# Patient Record
Sex: FEMALE | Race: OTHER | HISPANIC OR LATINO | URBAN - METROPOLITAN AREA
[De-identification: names, ages, dates, MRNs, and addresses within clinical notes are randomized per-mention and may not be internally consistent; named-entity substitution may affect disease eponyms.]

---
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
## 2018-01-24 ENCOUNTER — APPOINTMENT (OUTPATIENT)
Dept: NEUROLOGY | Facility: CLINIC | Age: 18
End: 2018-01-24
Payer: MEDICAID

## 2018-01-24 VITALS
SYSTOLIC BLOOD PRESSURE: 100 MMHG | HEIGHT: 65 IN | OXYGEN SATURATION: 99 % | BODY MASS INDEX: 31.65 KG/M2 | HEART RATE: 66 BPM | WEIGHT: 190 LBS | DIASTOLIC BLOOD PRESSURE: 60 MMHG

## 2018-01-24 DIAGNOSIS — F50.2 BULIMIA NERVOSA: ICD-10-CM

## 2018-01-24 DIAGNOSIS — G43.909 MIGRAINE, UNSPECIFIED, NOT INTRACTABLE, W/OUT STATUS MIGRAINOSUS: ICD-10-CM

## 2018-01-24 PROCEDURE — 99204 OFFICE O/P NEW MOD 45 MIN: CPT

## 2018-01-24 RX ORDER — SUMATRIPTAN 100 MG/1
100 TABLET, FILM COATED ORAL
Qty: 9 | Refills: 5 | Status: ACTIVE | COMMUNITY
Start: 2018-01-24 | End: 1900-01-01

## 2018-03-02 ENCOUNTER — APPOINTMENT (OUTPATIENT)
Dept: NEUROLOGY | Facility: CLINIC | Age: 18
End: 2018-03-02

## 2018-09-09 ENCOUNTER — EMERGENCY (EMERGENCY)
Facility: HOSPITAL | Age: 18
LOS: 1 days | Discharge: HOME | End: 2018-09-09
Admitting: EMERGENCY MEDICINE
Payer: COMMERCIAL

## 2018-09-09 VITALS
HEART RATE: 82 BPM | RESPIRATION RATE: 16 BRPM | TEMPERATURE: 98 F | DIASTOLIC BLOOD PRESSURE: 64 MMHG | OXYGEN SATURATION: 100 % | SYSTOLIC BLOOD PRESSURE: 107 MMHG

## 2018-09-09 PROCEDURE — 99283 EMERGENCY DEPT VISIT LOW MDM: CPT

## 2018-09-09 RX ORDER — ACETAMINOPHEN 500 MG
650 TABLET ORAL ONCE
Qty: 0 | Refills: 0 | Status: COMPLETED | OUTPATIENT
Start: 2018-09-09 | End: 2018-09-09

## 2018-09-09 RX ADMIN — Medication 650 MILLIGRAM(S): at 23:30

## 2018-09-09 NOTE — ED PROVIDER NOTE - PLAN OF CARE
Advance activity as tolerated.  Continue all previously prescribed medications as directed unless otherwise instructed.  Take Motrin (also sold as Advil or Ibuprofen) 400-600 mg (two or three 200 mg over the counter pills) every 8 hours as needed for moderate pain or fevers-- take with food. Take Tylenol 650mg (Two 325 mg pills) every 4-6 hours as needed for pain or fevers. Apply cool compresses for 15 minutes to affected area, 3-4 times per day. Follow up with your primary care physician and hand specialist (referral list provided) in 48-72 hours- bring copies of your results.  Return to the ER for worsening or persistent symptoms, including but not limited to worsening/persistent pain, fevers, redness, swelling, numbness, weakness, and/or ANY NEW OR CONCERNING SYMPTOMS. If you have issues obtaining follow up, please call: [phone] to obtain a doctor or specialist who takes your insurance in your area.  You may call [phone] to make an appointment with the internal medicine clinic.

## 2018-09-09 NOTE — ED PROVIDER NOTE - ABDOMINAL EXAM
Abdomen soft, non-tender, non-distended. No bruising, no abrasion, no laceration./nontender.../soft/nondistended

## 2018-09-09 NOTE — ED PROVIDER NOTE - MEDICAL DECISION MAKING DETAILS
18y/o F, non-smoker, with PMHx of appendectomy, presents with R hand pain x5h -- r/o fx, likely contusion -- will XR, pain control
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
## 2018-09-09 NOTE — ED PROVIDER NOTE - UPPER EXTREMITY EXAM, RIGHT
TENDERNESS/R hand has TTP over R thenar eminence. No palpable deformities. ROM to 1st digit limited secondary to pain. Sensation intact to light touch distal to injury. <2s capillary refill. TENDERNESS/R hand has TTP over R thenar eminence. No palpable deformities. ROM to 1st digit limited secondary to pain. Sensation intact to light touch distal to injury. <2s capillary refill.  No redness, no warmth, no crepitus, no fluctuance., no induration. TENDERNESS/No snuffbox tenderness; no pain with axial loading. R hand has TTP over R thenar eminence. No palpable deformities. ROM to 1st digit limited secondary to pain. Sensation intact to light touch distal to injury. <2s capillary refill.  No redness, no warmth, no crepitus, no fluctuance., no induration.

## 2018-09-09 NOTE — ED PROVIDER NOTE - PROGRESS NOTE DETAILS
BISHNU ROCHA:  Xray negative for acute pathology.  Pt medically stable for discharge. Pt to follow up with PMD and hand specialist (referral list provided).

## 2018-09-09 NOTE — ED PROVIDER NOTE - CARE PLAN
Principal Discharge DX:	Hand pain  Assessment and plan of treatment:	Advance activity as tolerated.  Continue all previously prescribed medications as directed unless otherwise instructed.  Take Motrin (also sold as Advil or Ibuprofen) 400-600 mg (two or three 200 mg over the counter pills) every 8 hours as needed for moderate pain or fevers-- take with food. Take Tylenol 650mg (Two 325 mg pills) every 4-6 hours as needed for pain or fevers. Apply cool compresses for 15 minutes to affected area, 3-4 times per day. Follow up with your primary care physician and hand specialist (referral list provided) in 48-72 hours- bring copies of your results.  Return to the ER for worsening or persistent symptoms, including but not limited to worsening/persistent pain, fevers, redness, swelling, numbness, weakness, and/or ANY NEW OR CONCERNING SYMPTOMS. If you have issues obtaining follow up, please call: [phone] to obtain a doctor or specialist who takes your insurance in your area.  You may call [phone] to make an appointment with the internal medicine clinic.

## 2018-09-10 PROCEDURE — 73130 X-RAY EXAM OF HAND: CPT | Mod: 26,RT

## 2022-02-14 NOTE — ED PROVIDER NOTE - NS_ACPWITHSCRIBE_ED_ALL_ED
97.8 "I personally performed the services described in the documentation  recorded by the scribe in my presence, and it accurately and completely records my words and action.”

## 2022-09-09 NOTE — ED PROVIDER NOTE - CONDITION AT DISCHARGE:
HST POST-STUDY QUESTIONNAIRE    1. What time did you go to bed? 12 AM   2. How long do you think it took to fall asleep?  30 minutes   3. What time did you wake up to start the day?  7:00 AM   4. Did you get up during the night at all?  yes  5. If you woke up, do you remember approximately what time(s)? No   6. Did you have any difficulty with the equipment?  No  7. Did you us any type of treatment with this study?  None  8. Was the head of the bed elevated? No  9. Did you sleep in a recliner?  No  10. Did you stop using CPAP at least 3 days before this test?  NA  11. Any other information you'd like us to know?       Improved

## 2024-04-04 ENCOUNTER — EMERGENCY (EMERGENCY)
Facility: HOSPITAL | Age: 24
LOS: 1 days | Discharge: HOME | End: 2024-04-04
Attending: STUDENT IN AN ORGANIZED HEALTH CARE EDUCATION/TRAINING PROGRAM | Admitting: STUDENT IN AN ORGANIZED HEALTH CARE EDUCATION/TRAINING PROGRAM
Payer: MEDICAID

## 2024-04-04 VITALS
SYSTOLIC BLOOD PRESSURE: 109 MMHG | TEMPERATURE: 97 F | OXYGEN SATURATION: 100 % | DIASTOLIC BLOOD PRESSURE: 70 MMHG | RESPIRATION RATE: 16 BRPM | HEART RATE: 85 BPM | WEIGHT: 166.89 LBS

## 2024-04-04 VITALS
HEART RATE: 83 BPM | TEMPERATURE: 97 F | SYSTOLIC BLOOD PRESSURE: 108 MMHG | OXYGEN SATURATION: 100 % | RESPIRATION RATE: 18 BRPM | DIASTOLIC BLOOD PRESSURE: 72 MMHG

## 2024-04-04 LAB
ALBUMIN SERPL ELPH-MCNC: 4.3 G/DL — SIGNIFICANT CHANGE UP (ref 3.3–5)
ALP SERPL-CCNC: 71 U/L — SIGNIFICANT CHANGE UP (ref 40–120)
ALT FLD-CCNC: 5 U/L — SIGNIFICANT CHANGE UP (ref 4–33)
ANION GAP SERPL CALC-SCNC: 13 MMOL/L — SIGNIFICANT CHANGE UP (ref 7–14)
APPEARANCE UR: CLEAR — SIGNIFICANT CHANGE UP
AST SERPL-CCNC: 13 U/L — SIGNIFICANT CHANGE UP (ref 4–32)
BACTERIA # UR AUTO: NEGATIVE /HPF — SIGNIFICANT CHANGE UP
BASOPHILS # BLD AUTO: 0.03 K/UL — SIGNIFICANT CHANGE UP (ref 0–0.2)
BASOPHILS NFR BLD AUTO: 0.3 % — SIGNIFICANT CHANGE UP (ref 0–2)
BILIRUB SERPL-MCNC: 0.8 MG/DL — SIGNIFICANT CHANGE UP (ref 0.2–1.2)
BILIRUB UR-MCNC: NEGATIVE — SIGNIFICANT CHANGE UP
BUN SERPL-MCNC: 15 MG/DL — SIGNIFICANT CHANGE UP (ref 7–23)
CALCIUM SERPL-MCNC: 9.3 MG/DL — SIGNIFICANT CHANGE UP (ref 8.4–10.5)
CAST: 0 /LPF — SIGNIFICANT CHANGE UP (ref 0–4)
CHLORIDE SERPL-SCNC: 102 MMOL/L — SIGNIFICANT CHANGE UP (ref 98–107)
CO2 SERPL-SCNC: 24 MMOL/L — SIGNIFICANT CHANGE UP (ref 22–31)
COLOR SPEC: YELLOW — SIGNIFICANT CHANGE UP
CREAT SERPL-MCNC: 0.75 MG/DL — SIGNIFICANT CHANGE UP (ref 0.5–1.3)
DIFF PNL FLD: NEGATIVE — SIGNIFICANT CHANGE UP
EGFR: 113 ML/MIN/1.73M2 — SIGNIFICANT CHANGE UP
EOSINOPHIL # BLD AUTO: 0.32 K/UL — SIGNIFICANT CHANGE UP (ref 0–0.5)
EOSINOPHIL NFR BLD AUTO: 3.1 % — SIGNIFICANT CHANGE UP (ref 0–6)
GLUCOSE SERPL-MCNC: 88 MG/DL — SIGNIFICANT CHANGE UP (ref 70–99)
GLUCOSE UR QL: NEGATIVE MG/DL — SIGNIFICANT CHANGE UP
HCG SERPL-ACNC: <1 MIU/ML — SIGNIFICANT CHANGE UP
HCT VFR BLD CALC: 36 % — SIGNIFICANT CHANGE UP (ref 34.5–45)
HGB BLD-MCNC: 12 G/DL — SIGNIFICANT CHANGE UP (ref 11.5–15.5)
IANC: 6.19 K/UL — SIGNIFICANT CHANGE UP (ref 1.8–7.4)
IMM GRANULOCYTES NFR BLD AUTO: 0.3 % — SIGNIFICANT CHANGE UP (ref 0–0.9)
KETONES UR-MCNC: ABNORMAL MG/DL
LEUKOCYTE ESTERASE UR-ACNC: NEGATIVE — SIGNIFICANT CHANGE UP
LIDOCAIN IGE QN: 42 U/L — SIGNIFICANT CHANGE UP (ref 7–60)
LYMPHOCYTES # BLD AUTO: 3.21 K/UL — SIGNIFICANT CHANGE UP (ref 1–3.3)
LYMPHOCYTES # BLD AUTO: 31.1 % — SIGNIFICANT CHANGE UP (ref 13–44)
MCHC RBC-ENTMCNC: 30.4 PG — SIGNIFICANT CHANGE UP (ref 27–34)
MCHC RBC-ENTMCNC: 33.3 GM/DL — SIGNIFICANT CHANGE UP (ref 32–36)
MCV RBC AUTO: 91.1 FL — SIGNIFICANT CHANGE UP (ref 80–100)
MONOCYTES # BLD AUTO: 0.55 K/UL — SIGNIFICANT CHANGE UP (ref 0–0.9)
MONOCYTES NFR BLD AUTO: 5.3 % — SIGNIFICANT CHANGE UP (ref 2–14)
NEUTROPHILS # BLD AUTO: 6.19 K/UL — SIGNIFICANT CHANGE UP (ref 1.8–7.4)
NEUTROPHILS NFR BLD AUTO: 59.9 % — SIGNIFICANT CHANGE UP (ref 43–77)
NITRITE UR-MCNC: NEGATIVE — SIGNIFICANT CHANGE UP
NRBC # BLD: 0 /100 WBCS — SIGNIFICANT CHANGE UP (ref 0–0)
NRBC # FLD: 0 K/UL — SIGNIFICANT CHANGE UP (ref 0–0)
PH UR: 6.5 — SIGNIFICANT CHANGE UP (ref 5–8)
PLATELET # BLD AUTO: 301 K/UL — SIGNIFICANT CHANGE UP (ref 150–400)
POTASSIUM SERPL-MCNC: 4.4 MMOL/L — SIGNIFICANT CHANGE UP (ref 3.5–5.3)
POTASSIUM SERPL-SCNC: 4.4 MMOL/L — SIGNIFICANT CHANGE UP (ref 3.5–5.3)
PROT SERPL-MCNC: 7.5 G/DL — SIGNIFICANT CHANGE UP (ref 6–8.3)
PROT UR-MCNC: NEGATIVE MG/DL — SIGNIFICANT CHANGE UP
RBC # BLD: 3.95 M/UL — SIGNIFICANT CHANGE UP (ref 3.8–5.2)
RBC # FLD: 12.5 % — SIGNIFICANT CHANGE UP (ref 10.3–14.5)
RBC CASTS # UR COMP ASSIST: 2 /HPF — SIGNIFICANT CHANGE UP (ref 0–4)
SODIUM SERPL-SCNC: 139 MMOL/L — SIGNIFICANT CHANGE UP (ref 135–145)
SP GR SPEC: 1.03 — SIGNIFICANT CHANGE UP (ref 1–1.03)
SQUAMOUS # UR AUTO: 5 /HPF — SIGNIFICANT CHANGE UP (ref 0–5)
UROBILINOGEN FLD QL: 1 MG/DL — SIGNIFICANT CHANGE UP (ref 0.2–1)
WBC # BLD: 10.33 K/UL — SIGNIFICANT CHANGE UP (ref 3.8–10.5)
WBC # FLD AUTO: 10.33 K/UL — SIGNIFICANT CHANGE UP (ref 3.8–10.5)
WBC UR QL: 1 /HPF — SIGNIFICANT CHANGE UP (ref 0–5)

## 2024-04-04 PROCEDURE — 99285 EMERGENCY DEPT VISIT HI MDM: CPT

## 2024-04-04 PROCEDURE — 74177 CT ABD & PELVIS W/CONTRAST: CPT | Mod: 26,MC

## 2024-04-04 RX ORDER — DIATRIZOATE MEGLUMINE 180 MG/ML
30 INJECTION, SOLUTION INTRAVESICAL ONCE
Refills: 0 | Status: DISCONTINUED | OUTPATIENT
Start: 2024-04-04 | End: 2024-04-04

## 2024-04-04 RX ORDER — ONDANSETRON 8 MG/1
4 TABLET, FILM COATED ORAL ONCE
Refills: 0 | Status: COMPLETED | OUTPATIENT
Start: 2024-04-04 | End: 2024-04-04

## 2024-04-04 RX ORDER — SODIUM CHLORIDE 9 MG/ML
1000 INJECTION INTRAMUSCULAR; INTRAVENOUS; SUBCUTANEOUS ONCE
Refills: 0 | Status: COMPLETED | OUTPATIENT
Start: 2024-04-04 | End: 2024-04-04

## 2024-04-04 RX ORDER — IOHEXOL 300 MG/ML
30 INJECTION, SOLUTION INTRAVENOUS ONCE
Refills: 0 | Status: COMPLETED | OUTPATIENT
Start: 2024-04-04 | End: 2024-04-04

## 2024-04-04 RX ORDER — KETOROLAC TROMETHAMINE 30 MG/ML
15 SYRINGE (ML) INJECTION ONCE
Refills: 0 | Status: DISCONTINUED | OUTPATIENT
Start: 2024-04-04 | End: 2024-04-04

## 2024-04-04 RX ADMIN — IOHEXOL 30 MILLILITER(S): 300 INJECTION, SOLUTION INTRAVENOUS at 21:17

## 2024-04-04 RX ADMIN — ONDANSETRON 4 MILLIGRAM(S): 8 TABLET, FILM COATED ORAL at 20:58

## 2024-04-04 RX ADMIN — Medication 15 MILLIGRAM(S): at 21:20

## 2024-04-04 RX ADMIN — SODIUM CHLORIDE 1000 MILLILITER(S): 9 INJECTION INTRAMUSCULAR; INTRAVENOUS; SUBCUTANEOUS at 20:59

## 2024-04-04 RX ADMIN — Medication 15 MILLIGRAM(S): at 20:58

## 2024-04-04 NOTE — ED PROVIDER NOTE - CLINICAL SUMMARY MEDICAL DECISION MAKING FREE TEXT BOX
23-year-old female with history of gastric bypass coming in with 1 week of nausea, vomiting, diffuse abdominal pain, flank pain. LMP few days ago. No f/c. Seen at [facility] - blood work and UA was unremarkable and she was discharged home. Last bm was this morning, no diarrhea, blood in stools. Well appearing. no distress. CTAB. RRR. Mild diffuse ttp more in suprapubic region. +cva ttp b/l. Ddx include but not limited to UTI/ pyelonephritis, anemia, electrolyte abnormalities. Low concern for sbo.

## 2024-04-04 NOTE — ED ADULT NURSE NOTE - NSFALLUNIVINTERV_ED_ALL_ED
Bed/Stretcher in lowest position, wheels locked, appropriate side rails in place/Call bell, personal items and telephone in reach/Instruct patient to call for assistance before getting out of bed/chair/stretcher/Non-slip footwear applied when patient is off stretcher/[place] to call system/Physically safe environment - no spills, clutter or unnecessary equipment/Purposeful proactive rounding/Room/bathroom lighting operational, light cord in reach

## 2024-04-04 NOTE — ED PROVIDER NOTE - PATIENT PORTAL LINK FT
You can access the FollowMyHealth Patient Portal offered by [facility] by registering at the following website: [email or web address]. By joining [facility]’s FollowMyHealth portal, you will also be able to view your health information using other applications (apps) compatible with our system.

## 2024-04-04 NOTE — ED ADULT TRIAGE NOTE - CHIEF COMPLAINT QUOTE
c/o N/V, pelvic pain, back pain, chills dizzy x 1 week. Denies urinary s/s. Reports menstrual cycle ended yesterday denies hx

## 2024-04-04 NOTE — ED PROVIDER NOTE - ATTENDING CONTRIBUTION TO CARE
23-year-old female with history of gastric bypass coming in with 1 week of nausea, vomiting, diffuse abdominal pain, flank pain. LMP few days ago. No f/c. Seen at [facility] - blood work and UA was unremarkable and she was discharged home. Last bm was this morning, no diarrhea, blood in stools. Well appearing. no distress. CTAB. RRR. Mild diffuse ttp more in suprapubic region. +cva ttp b/l. Ddx include but not limited to UTI/pyelonpehritis, anemia, electrolyte abnormalities. Low conern for sbo.

## 2024-04-04 NOTE — ED ADULT NURSE NOTE - OBJECTIVE STATEMENT
23 y female with past medical history appendicitis, gastric sleeve surgery in 2021 c/o N/V, pelvic pain, back pain,  1 week. Denies urinary s/s, diarrhea , fever. 23 y female with past medical history appendicitis, gastric sleeve surgery in 2021 c/o N/V, pelvic pain, back pain,  1 week. Denies urinary s/s, diarrhea , fever. josias BM today. 20g IV placed  L AC. meds administered as ordered.

## 2024-04-06 LAB
CULTURE RESULTS: SIGNIFICANT CHANGE UP
SPECIMEN SOURCE: SIGNIFICANT CHANGE UP

## 2024-09-21 ENCOUNTER — EMERGENCY (EMERGENCY)
Facility: HOSPITAL | Age: 24
LOS: 1 days | Discharge: HOME | End: 2024-09-21
Admitting: EMERGENCY MEDICINE
Payer: MEDICAID

## 2024-09-21 VITALS
WEIGHT: 166.89 LBS | TEMPERATURE: 98 F | DIASTOLIC BLOOD PRESSURE: 71 MMHG | HEART RATE: 92 BPM | HEIGHT: 65 IN | OXYGEN SATURATION: 100 % | RESPIRATION RATE: 16 BRPM | SYSTOLIC BLOOD PRESSURE: 118 MMHG

## 2024-09-21 LAB
ALBUMIN SERPL ELPH-MCNC: 4.3 G/DL — SIGNIFICANT CHANGE UP (ref 3.3–5)
ALP SERPL-CCNC: 72 U/L — SIGNIFICANT CHANGE UP (ref 40–120)
ALT FLD-CCNC: 8 U/L — SIGNIFICANT CHANGE UP (ref 4–33)
ANION GAP SERPL CALC-SCNC: 12 MMOL/L — SIGNIFICANT CHANGE UP (ref 7–14)
APPEARANCE UR: CLEAR — SIGNIFICANT CHANGE UP
AST SERPL-CCNC: 15 U/L — SIGNIFICANT CHANGE UP (ref 4–32)
BACTERIA # UR AUTO: ABNORMAL /HPF
BASOPHILS # BLD AUTO: 0.03 K/UL — SIGNIFICANT CHANGE UP (ref 0–0.2)
BASOPHILS NFR BLD AUTO: 0.3 % — SIGNIFICANT CHANGE UP (ref 0–2)
BILIRUB SERPL-MCNC: 0.8 MG/DL — SIGNIFICANT CHANGE UP (ref 0.2–1.2)
BILIRUB UR-MCNC: NEGATIVE — SIGNIFICANT CHANGE UP
BLD GP AB SCN SERPL QL: NEGATIVE — SIGNIFICANT CHANGE UP
BUN SERPL-MCNC: 13 MG/DL — SIGNIFICANT CHANGE UP (ref 7–23)
CALCIUM SERPL-MCNC: 9.2 MG/DL — SIGNIFICANT CHANGE UP (ref 8.4–10.5)
CAST: 0 /LPF — SIGNIFICANT CHANGE UP (ref 0–4)
CHLORIDE SERPL-SCNC: 101 MMOL/L — SIGNIFICANT CHANGE UP (ref 98–107)
CO2 SERPL-SCNC: 25 MMOL/L — SIGNIFICANT CHANGE UP (ref 22–31)
COLOR SPEC: YELLOW — SIGNIFICANT CHANGE UP
CREAT SERPL-MCNC: 0.75 MG/DL — SIGNIFICANT CHANGE UP (ref 0.5–1.3)
DIFF PNL FLD: NEGATIVE — SIGNIFICANT CHANGE UP
EGFR: 113 ML/MIN/1.73M2 — SIGNIFICANT CHANGE UP
EOSINOPHIL # BLD AUTO: 0.2 K/UL — SIGNIFICANT CHANGE UP (ref 0–0.5)
EOSINOPHIL NFR BLD AUTO: 1.7 % — SIGNIFICANT CHANGE UP (ref 0–6)
GLUCOSE SERPL-MCNC: 77 MG/DL — SIGNIFICANT CHANGE UP (ref 70–99)
GLUCOSE UR QL: NEGATIVE MG/DL — SIGNIFICANT CHANGE UP
HCG SERPL-ACNC: <1 MIU/ML — SIGNIFICANT CHANGE UP
HCT VFR BLD CALC: 37.6 % — SIGNIFICANT CHANGE UP (ref 34.5–45)
HGB BLD-MCNC: 12.3 G/DL — SIGNIFICANT CHANGE UP (ref 11.5–15.5)
HIV 1+2 AB+HIV1 P24 AG SERPL QL IA: SIGNIFICANT CHANGE UP
IANC: 7.71 K/UL — HIGH (ref 1.8–7.4)
IMM GRANULOCYTES NFR BLD AUTO: 0.3 % — SIGNIFICANT CHANGE UP (ref 0–0.9)
KETONES UR-MCNC: ABNORMAL MG/DL
LEUKOCYTE ESTERASE UR-ACNC: NEGATIVE — SIGNIFICANT CHANGE UP
LYMPHOCYTES # BLD AUTO: 2.87 K/UL — SIGNIFICANT CHANGE UP (ref 1–3.3)
LYMPHOCYTES # BLD AUTO: 24.8 % — SIGNIFICANT CHANGE UP (ref 13–44)
MCHC RBC-ENTMCNC: 30.2 PG — SIGNIFICANT CHANGE UP (ref 27–34)
MCHC RBC-ENTMCNC: 32.7 GM/DL — SIGNIFICANT CHANGE UP (ref 32–36)
MCV RBC AUTO: 92.4 FL — SIGNIFICANT CHANGE UP (ref 80–100)
MONOCYTES # BLD AUTO: 0.71 K/UL — SIGNIFICANT CHANGE UP (ref 0–0.9)
MONOCYTES NFR BLD AUTO: 6.1 % — SIGNIFICANT CHANGE UP (ref 2–14)
NEUTROPHILS # BLD AUTO: 7.71 K/UL — HIGH (ref 1.8–7.4)
NEUTROPHILS NFR BLD AUTO: 66.8 % — SIGNIFICANT CHANGE UP (ref 43–77)
NITRITE UR-MCNC: NEGATIVE — SIGNIFICANT CHANGE UP
NRBC # BLD: 0 /100 WBCS — SIGNIFICANT CHANGE UP (ref 0–0)
NRBC # FLD: 0 K/UL — SIGNIFICANT CHANGE UP (ref 0–0)
PH UR: 5.5 — SIGNIFICANT CHANGE UP (ref 5–8)
PLATELET # BLD AUTO: 307 K/UL — SIGNIFICANT CHANGE UP (ref 150–400)
POTASSIUM SERPL-MCNC: 3.5 MMOL/L — SIGNIFICANT CHANGE UP (ref 3.5–5.3)
POTASSIUM SERPL-SCNC: 3.5 MMOL/L — SIGNIFICANT CHANGE UP (ref 3.5–5.3)
PROT SERPL-MCNC: 7.5 G/DL — SIGNIFICANT CHANGE UP (ref 6–8.3)
PROT UR-MCNC: NEGATIVE MG/DL — SIGNIFICANT CHANGE UP
RBC # BLD: 4.07 M/UL — SIGNIFICANT CHANGE UP (ref 3.8–5.2)
RBC # FLD: 13.9 % — SIGNIFICANT CHANGE UP (ref 10.3–14.5)
RBC CASTS # UR COMP ASSIST: 0 /HPF — SIGNIFICANT CHANGE UP (ref 0–4)
RH IG SCN BLD-IMP: POSITIVE — SIGNIFICANT CHANGE UP
SODIUM SERPL-SCNC: 138 MMOL/L — SIGNIFICANT CHANGE UP (ref 135–145)
SP GR SPEC: 1.03 — SIGNIFICANT CHANGE UP (ref 1–1.03)
SQUAMOUS # UR AUTO: 8 /HPF — HIGH (ref 0–5)
UROBILINOGEN FLD QL: 1 MG/DL — SIGNIFICANT CHANGE UP (ref 0.2–1)
WBC # BLD: 11.56 K/UL — HIGH (ref 3.8–10.5)
WBC # FLD AUTO: 11.56 K/UL — HIGH (ref 3.8–10.5)
WBC UR QL: 3 /HPF — SIGNIFICANT CHANGE UP (ref 0–5)

## 2024-09-21 PROCEDURE — 99284 EMERGENCY DEPT VISIT MOD MDM: CPT

## 2024-09-21 NOTE — ED PROVIDER NOTE - CLINICAL SUMMARY MEDICAL DECISION MAKING FREE TEXT BOX
Patient is a 23-year-old female with past medical history of gastric bypass surgery, appendectomy, tonsillectomy presents with irregular menses x 2 months.  Patient reports intermittent vaginal spotting lasting for 2 days every week for the past 2 months.  Patient also reports intermittent sharp pelvic cramping lasting for seconds for the past 2 months.  Patient also reports urinary urgency for the past 1 week.  Patient reports associated nausea.  Patient denies any fevers, chills, vomiting, flank pain, chest pain, upper abdominal pain, diarrhea, constipation, melena, bright blood per rectum, vaginal discharge, hematuria, history of STDs.  This is a patient with irregular menses and pelvic pain.  Consider possible pregnancy.  Consider possible UTI.  Plan to order labs, urinalysis, urine culture, GC, HIV, RPR, transvaginal ultrasound.  Disposition pending workup.

## 2024-09-21 NOTE — ED PROVIDER NOTE - NSPTACCESSSVCSAPPT_ED_ALL_ED
Reshma's spouse is calling and has some questions.  RN returned call and left voice message to call tomorrow while we are in clinic.  We will be able to answer the questions.  
Specialty Care (immediate)...

## 2024-09-21 NOTE — ED PROVIDER NOTE - PATIENT PORTAL LINK FT
You can access the FollowMyHealth Patient Portal offered by [facility] by registering at the following website: [email or web address]. By joining [facility]’s FollowMyHealth portal, you will also be able to view your health information using other applications (apps) compatible with our system.

## 2024-09-21 NOTE — ED ADULT NURSE NOTE - OBJECTIVE STATEMENT
pt reporting to the ED for irregular menstrual cycle X 2 months with lower abdominal pain X 1 month. Reports associated dysuria over the last week, with cloudy urine. Pt is AOX4. Pt denies chest pain or SOB. PT respirations even an unlabored. Pt appears to be comfortable, in NAD. Pt denies fever, chills, n/v/d. Pt denies , hematuria. LPN placing IV at this time. Awaiting further orders.

## 2024-09-21 NOTE — ED ADULT TRIAGE NOTE - CHIEF COMPLAINT QUOTE
Pt requesting a blood pregnancy test.  Pt has been having irregular periods recently. Took a pregnancy test at home which was negative.  Pt reports pressure to b/l lower abdomen.  Pt denies medical history

## 2024-09-21 NOTE — ED PROVIDER NOTE - OBJECTIVE STATEMENT
Patient is a 23-year-old female with past medical history of gastric bypass surgery, appendectomy, tonsillectomy presents with irregular menses x 2 months.  Patient reports intermittent vaginal spotting lasting for 2 days every week for the past 2 months.  Patient also reports intermittent sharp pelvic cramping lasting for seconds for the past 2 months.  Patient also reports urinary urgency for the past 1 week.  Patient reports associated nausea.  Patient denies any fevers, chills, vomiting, flank pain, chest pain, upper abdominal pain, diarrhea, constipation, melena, bright blood per rectum, vaginal discharge, hematuria, history of STDs.

## 2024-09-21 NOTE — ED PROVIDER NOTE - NSFOLLOWUPINSTRUCTIONS_ED_ALL_ED_FT
Advance activity as tolerated.  Continue all previously prescribed medications as directed unless otherwise instructed.  Follow up with your primary care physician and OB/GYN (referral list provided or call [phone] to make an appointment with the OB/GYN clinic)  in 48-72 hours- bring copies of your results.  Return to the Emergency Department for worsening or persistent symptoms, including but not limited to worsening/persistent pain, heavy vaginal bleeding requiring more than or equal to 2 pads in 1 hour, lightheadedness, passing out, chest pain, shortness of breath, and/or ANY NEW OR CONCERNING SYMPTOMS. If you have issues obtaining follow up, please call: [phone] to obtain a doctor or specialist who takes your insurance in your area.  You may call [phone] to make an appointment with the internal medicine clinic.     ABNORMAL (DYSFUNCTIONAL) UTERINE BLEEDING - General Information    Abnormal (Dysfunctional) Uterine Bleeding    WHAT YOU NEED TO KNOW:    What is abnormal uterine bleeding (AUB)? AUB is uterine bleeding that is not usual for you. It may also be called dysfunctional uterine bleeding. You may have bleeding from your uterus at times other than your normal monthly period. Your monthly periods may last longer or shorter, and bleeding may be heavier or lighter than usual. AUB can be acute (lasting a short time) or chronic (lasting longer than 6 months).  Female Reproductive System    What causes AUB? The following can cause or increase your risk for AUB:    Age 40 or older, or 14 or younger    Too much or too little estrogen    An ovary does not release an egg during ovulation    A medical condition, such as polycystic ovary syndrome (PCOS), diabetes, or long-term liver or kidney disease    Not giving birth    A growth, such as a tumor or a polyp    Trauma such as an injury to your uterus or cervix    An infection such as a sexually transmitted infection    An overactive or underactive thyroid or adrenal gland    An eating disorder, such as bulimia or anorexia, or too much exercise    Certain medications, or hormone replacement therapy    A large weight gain or loss  What are the signs and symptoms of AUB?    Bleeding or spotting between periods    Bleeding that starts 12 months or longer after you have been through menopause    The amount of bleeding during your period is heavier or lighter than usual    The number of days that you bleed during your regular period is longer than usual, or more than 7 days    The number of days that you bleed is shorter than usual, or less than 2 days    The time between your monthly periods is shorter or longer than usual  How is AUB diagnosed? Your healthcare provider will ask about your monthly periods. Tell him or her about all changes to your periods. Include when the changes started. Your provider may ask the age you were when you got your first period. He or she may ask if you use tampons or sanitary pads. Tell him or her about any medical conditions you have and all medicines you currently use. You may need any of the following, depending on your age and medical history:    Blood tests may be done to find the cause of your AUB and problems caused by AUB, such as anemia. Your provider may recommend screening for an STI as part of the blood tests.    A pelvic exam may be done to find the source of your bleeding.    A hysteroscopy is a procedure to look at your endometrium. The endometrium is the lining inside of your uterus. Your healthcare provider will insert a small tube with a camera at the end into your uterus.    A biopsy is a procedure to remove a small piece of tissue from the endometrium. The tissue is sent to a lab for tests.    An ultrasound uses sound waves to show pictures of your uterus, ovaries, tubes, and vagina on a monitor.    A pap smear may be needed. Your healthcare provider takes a sample of tissue from your cervix and sends it to a lab for tests.  How is AUB treated?    Medicines may be given to treat a condition causing AUB. An example is medicine to increase or decrease the amount of hormone your thyroid makes. Hormones may be given to help decrease bleeding by making your monthly periods more regular. Sometimes this medicine may be given as birth control pills. Iron supplements may be given if your blood iron level decreases because of heavy bleeding.    Procedures such as endometrial ablation or dilation and curettage may be used to control your bleeding.    Surgery may be needed if medicines do not work or cannot be used. You may need an abdominal or vaginal hysterectomy. A hysterectomy is surgery to remove your uterus.  How do I care for myself at home?    Include foods high in iron if needed. Examples of foods high in iron are leafy green vegetables, beef, pork, liver, eggs, and whole-grain breads and cereals.  Sources of Iron      Keep a diary of your menstrual cycles. Keep track of the number of tampons or pads you use each day.    Talk to your healthcare provider before you start a weight loss program. You may need to wait until the abnormal bleeding has stopped before you try to lose weight. The amount of iron in your blood should be normal before you lose weight. Ask your provider if weight loss will help your AUB. He or she can tell you what weight is healthy for you. He or she can help you create a safe weight loss plan, if needed.  When should I seek immediate care?    You continue to bleed heavily, or you feel faint.    When should I call my doctor or gynecologist?    You need to change your sanitary pad or tampon more than 1 time each hour.    Your medicine causes nausea, vomiting, or diarrhea.    You have questions or concerns about your condition or care.  CARE AGREEMENT:    You have the right to help plan your care. Learn about your health condition and how it may be treated. Discuss treatment options with your healthcare providers to decide what care you want to receive. You always have the right to refuse treatment.    © [employer] 1973, 2023     PELVIC PAIN IN WOMEN - General Information    Pelvic Pain in Women    WHAT YOU NEED TO KNOW:    What do I need to know about pelvic pain? You may have pain on one or both sides of your pelvis. Pelvic pain may occur with certain body positions or activities, such as when you have sex or a bowel movement. It may worsen during your monthly period or after you sit or stand for a long time. Chronic pelvic pain is pain that continues for longer than 6 months.    What causes pelvic pain in women?    Gynecologic conditions, such as pelvic inflammatory disease (PID), endometriosis, or uterine fibroids    Bowel and bladder conditions, such as irritable bowel syndrome, bladder inflammation, or tumors    Muscle and nerve conditions, such as swelling or weakness of your pelvic muscles, or damage to the nerves of your pelvic area (neuropathy)    Psychological issues as a result of physical or sexual abuse, or drug abuse  How is pelvic pain treated?    Pain medicine may be given in pills, injections, or creams to relieve your pain.    Hormones may be given if your pain gets worse with your menstrual cycle.    Antibiotics may be given if your pain is caused by infection.    Surgery may be done if other treatments do not relieve your pain.  How can I manage my pelvic pain?    Keep a pain diary. Write down when your pain happens, how severe it is, and any other symptoms you have with your pain. A diary will help you keep track of pain cycles. It may also help your healthcare provider find out what is causing your pain.    Learn ways to relax. Deep breathing, meditation, and relaxation techniques can help decrease your pain. When you are tense, your pain may increase.    Change the foods you eat if you have irritable bowel syndrome. Ask your healthcare provider about the best foods for you.  Call 911 for any of the following:    You have severe chest pain and sudden trouble breathing.    When should I seek immediate care?    You have heavy or unusual vaginal bleeding, and you feel lightheaded or faint.    When should I contact my healthcare provider?    You have pelvic pain that does not go away after you take pain medicine.    You develop new symptoms or your symptoms are worse than before.    You have questions or concerns about your condition or care.  CARE AGREEMENT:    You have the right to help plan your care. Learn about your health condition and how it may be treated. Discuss treatment options with your healthcare providers to decide what care you want to receive. You always have the right to refuse treatment.    © [employer] 1973, 2023 Follow up with OB/GYN doctor within 2-3 days of ER discharge.  If you need assistance finding a doctor to follow up with, you may call the [facility] Patient Access Services helpline at [phone] to find names/contact #s for a provider/specialist to follow up with.  **Bring your discharge papers / test results with you to your follow up appointment.      Please also notify your primary medical doctor of your ER visit and follow up within 2-3 days of ER discharge.  **Bring your discharge papers / test results with you to your follow up appointment.        Rest / no strenuous activity.  Stay well hydrated.    A copy of your test results is being provided to you.  All results including incidental findings were reviewed at discharge.  Should you have any questions that arise after you are discharged, please feel free to contact the ER ([phone]) to have your questions answered.    MEDICATIONS:    For pain, you may take ibuprofen (Motrin or Advil) or acetaminophen (Tylenol) as needed:   --ibuprofen (Motrin or Advil):  over-the-counter 200 milligram tablets:       Take three tablets (600 milligram dose) every 6 hours as needed for pain/inflammation; take WITH FOOD.   --acetaminophen (Tylenol):  over-the-counter; follow package instructions carefully regarding      dosage / usage.  Be careful not to exceed the total daily maximum dosage.      ***Return to the Emergency Department IMMEDIATELY if you experience any new / worsening symptoms or have any problems / concerns.***    ABNORMAL (DYSFUNCTIONAL) UTERINE BLEEDING - General Information    Abnormal (Dysfunctional) Uterine Bleeding    WHAT YOU NEED TO KNOW:    What is abnormal uterine bleeding (AUB)? AUB is uterine bleeding that is not usual for you. It may also be called dysfunctional uterine bleeding. You may have bleeding from your uterus at times other than your normal monthly period. Your monthly periods may last longer or shorter, and bleeding may be heavier or lighter than usual. AUB can be acute (lasting a short time) or chronic (lasting longer than 6 months).  Female Reproductive System    What causes AUB? The following can cause or increase your risk for AUB:    Age 40 or older, or 14 or younger    Too much or too little estrogen    An ovary does not release an egg during ovulation    A medical condition, such as polycystic ovary syndrome (PCOS), diabetes, or long-term liver or kidney disease    Not giving birth    A growth, such as a tumor or a polyp    Trauma such as an injury to your uterus or cervix    An infection such as a sexually transmitted infection    An overactive or underactive thyroid or adrenal gland    An eating disorder, such as bulimia or anorexia, or too much exercise    Certain medications, or hormone replacement therapy    A large weight gain or loss  What are the signs and symptoms of AUB?    Bleeding or spotting between periods    Bleeding that starts 12 months or longer after you have been through menopause    The amount of bleeding during your period is heavier or lighter than usual    The number of days that you bleed during your regular period is longer than usual, or more than 7 days    The number of days that you bleed is shorter than usual, or less than 2 days    The time between your monthly periods is shorter or longer than usual  How is AUB diagnosed? Your healthcare provider will ask about your monthly periods. Tell him or her about all changes to your periods. Include when the changes started. Your provider may ask the age you were when you got your first period. He or she may ask if you use tampons or sanitary pads. Tell him or her about any medical conditions you have and all medicines you currently use. You may need any of the following, depending on your age and medical history:    Blood tests may be done to find the cause of your AUB and problems caused by AUB, such as anemia. Your provider may recommend screening for an STI as part of the blood tests.    A pelvic exam may be done to find the source of your bleeding.    A hysteroscopy is a procedure to look at your endometrium. The endometrium is the lining inside of your uterus. Your healthcare provider will insert a small tube with a camera at the end into your uterus.    A biopsy is a procedure to remove a small piece of tissue from the endometrium. The tissue is sent to a lab for tests.    An ultrasound uses sound waves to show pictures of your uterus, ovaries, tubes, and vagina on a monitor.    A pap smear may be needed. Your healthcare provider takes a sample of tissue from your cervix and sends it to a lab for tests.  How is AUB treated?    Medicines may be given to treat a condition causing AUB. An example is medicine to increase or decrease the amount of hormone your thyroid makes. Hormones may be given to help decrease bleeding by making your monthly periods more regular. Sometimes this medicine may be given as birth control pills. Iron supplements may be given if your blood iron level decreases because of heavy bleeding.    Procedures such as endometrial ablation or dilation and curettage may be used to control your bleeding.    Surgery may be needed if medicines do not work or cannot be used. You may need an abdominal or vaginal hysterectomy. A hysterectomy is surgery to remove your uterus.  How do I care for myself at home?    Include foods high in iron if needed. Examples of foods high in iron are leafy green vegetables, beef, pork, liver, eggs, and whole-grain breads and cereals.  Sources of Iron      Keep a diary of your menstrual cycles. Keep track of the number of tampons or pads you use each day.    Talk to your healthcare provider before you start a weight loss program. You may need to wait until the abnormal bleeding has stopped before you try to lose weight. The amount of iron in your blood should be normal before you lose weight. Ask your provider if weight loss will help your AUB. He or she can tell you what weight is healthy for you. He or she can help you create a safe weight loss plan, if needed.  When should I seek immediate care?    You continue to bleed heavily, or you feel faint.    When should I call my doctor or gynecologist?    You need to change your sanitary pad or tampon more than 1 time each hour.    Your medicine causes nausea, vomiting, or diarrhea.    You have questions or concerns about your condition or care.  CARE AGREEMENT:    You have the right to help plan your care. Learn about your health condition and how it may be treated. Discuss treatment options with your healthcare providers to decide what care you want to receive. You always have the right to refuse treatment.    © [employer] 1973, 2023     PELVIC PAIN IN WOMEN - General Information    Pelvic Pain in Women    WHAT YOU NEED TO KNOW:    What do I need to know about pelvic pain? You may have pain on one or both sides of your pelvis. Pelvic pain may occur with certain body positions or activities, such as when you have sex or a bowel movement. It may worsen during your monthly period or after you sit or stand for a long time. Chronic pelvic pain is pain that continues for longer than 6 months.    What causes pelvic pain in women?    Gynecologic conditions, such as pelvic inflammatory disease (PID), endometriosis, or uterine fibroids    Bowel and bladder conditions, such as irritable bowel syndrome, bladder inflammation, or tumors    Muscle and nerve conditions, such as swelling or weakness of your pelvic muscles, or damage to the nerves of your pelvic area (neuropathy)    Psychological issues as a result of physical or sexual abuse, or drug abuse  How is pelvic pain treated?    Pain medicine may be given in pills, injections, or creams to relieve your pain.    Hormones may be given if your pain gets worse with your menstrual cycle.    Antibiotics may be given if your pain is caused by infection.    Surgery may be done if other treatments do not relieve your pain.  How can I manage my pelvic pain?    Keep a pain diary. Write down when your pain happens, how severe it is, and any other symptoms you have with your pain. A diary will help you keep track of pain cycles. It may also help your healthcare provider find out what is causing your pain.    Learn ways to relax. Deep breathing, meditation, and relaxation techniques can help decrease your pain. When you are tense, your pain may increase.    Change the foods you eat if you have irritable bowel syndrome. Ask your healthcare provider about the best foods for you.  Call 911 for any of the following:    You have severe chest pain and sudden trouble breathing.    When should I seek immediate care?    You have heavy or unusual vaginal bleeding, and you feel lightheaded or faint.    When should I contact my healthcare provider?    You have pelvic pain that does not go away after you take pain medicine.    You develop new symptoms or your symptoms are worse than before.    You have questions or concerns about your condition or care.  CARE AGREEMENT:    You have the right to help plan your care. Learn about your health condition and how it may be treated. Discuss treatment options with your healthcare providers to decide what care you want to receive. You always have the right to refuse treatment.    © [employer] 1973, 2023

## 2024-09-21 NOTE — ED PROVIDER NOTE - NSPTACCESSSVCSAPPTDETAILS_ED_ALL_ED_FT
irregular menses irregular menses hx and c/o pelvic pain; pt should ideally be seen early this coming week for continued care

## 2024-09-21 NOTE — ED ADULT NURSE NOTE - NSFALLUNIVINTERV_ED_ALL_ED
Dr. Ramirez here, Sterile speculum exam done, advanced dilitation noted. SVE- 7.5cm/100%/-2. Cultures obtained. Pt to be admitted for labor, possible R C/S. Transferred to LDR#3 per W/C. Report given to Emmanuel SHERIDAN.    Bed/Stretcher in lowest position, wheels locked, appropriate side rails in place/Call bell, personal items and telephone in reach/Instruct patient to call for assistance before getting out of bed/chair/stretcher/Non-slip footwear applied when patient is off stretcher/[place] to call system/Physically safe environment - no spills, clutter or unnecessary equipment/Purposeful proactive rounding/Room/bathroom lighting operational, light cord in reach

## 2024-09-21 NOTE — ED PROVIDER NOTE - PROGRESS NOTE DETAILS
BISHNU Shipman Addendum----This patient was signed out to me by BISHNU Frausto; pt was pending [facility] at time of sign out.  In the interim, pt objectively noted to be resting comfortably; pt has been clinically stable; no issues thus far.  [facility] official radiology report states: "....IMPRESSION: Simple right ovarian cyst measuring up to 2.6 cm. Vascular flow documented to the ovary. Small amount of free fluid in the pelvis.".  Per sign-out discussion with BISHNU Frausto, if no emergent findings on US, pt to be dc'd home with outpatient Ob/Gyn f/u advised.  Pt will be dc'd home per below instructions.

## 2024-09-21 NOTE — ED ADULT TRIAGE NOTE - RESPIRATORY RATE (BREATHS/MIN)
Sony Mejia needs refill of   Requested Prescriptions     Pending Prescriptions Disp Refills    fluticasone (FLONASE) 50 MCG/ACT nasal spray 3 Bottle 3     Si sprays by Nasal route daily 2 sprays each nostril daily. .. PRN       Last Filled on:  2020 one year supply. Refill is to soon.      Last Visit Date:  2021    Next Visit Date:    Visit date not found
16

## 2024-09-22 VITALS
HEART RATE: 69 BPM | DIASTOLIC BLOOD PRESSURE: 76 MMHG | OXYGEN SATURATION: 97 % | SYSTOLIC BLOOD PRESSURE: 109 MMHG | TEMPERATURE: 98 F | RESPIRATION RATE: 16 BRPM

## 2024-09-22 LAB
HCV AB S/CO SERPL IA: 0.17 S/CO — SIGNIFICANT CHANGE UP (ref 0–0.99)
HCV AB SERPL-IMP: SIGNIFICANT CHANGE UP

## 2024-09-22 PROCEDURE — 76830 TRANSVAGINAL US NON-OB: CPT | Mod: 26

## 2024-09-23 LAB
C TRACH RRNA SPEC QL NAA+PROBE: SIGNIFICANT CHANGE UP
CULTURE RESULTS: SIGNIFICANT CHANGE UP
N GONORRHOEA RRNA SPEC QL NAA+PROBE: SIGNIFICANT CHANGE UP
SPECIMEN SOURCE: SIGNIFICANT CHANGE UP
T PALLIDUM AB TITR SER: NEGATIVE — SIGNIFICANT CHANGE UP

## 2024-09-23 NOTE — ED POST DISCHARGE NOTE - REASON FOR FOLLOW-UP
Patient called fro results. Discussed with patient Syphilis and Hep C both negative. GC / Chlamydia still testing. Other

## 2025-01-21 ENCOUNTER — EMERGENCY (EMERGENCY)
Facility: HOSPITAL | Age: 25
LOS: 1 days | Discharge: HOME | End: 2025-01-21
Attending: STUDENT IN AN ORGANIZED HEALTH CARE EDUCATION/TRAINING PROGRAM
Payer: MEDICAID

## 2025-01-21 VITALS
HEIGHT: 65 IN | DIASTOLIC BLOOD PRESSURE: 73 MMHG | WEIGHT: 169.09 LBS | RESPIRATION RATE: 16 BRPM | TEMPERATURE: 97 F | SYSTOLIC BLOOD PRESSURE: 120 MMHG | OXYGEN SATURATION: 100 % | HEART RATE: 108 BPM

## 2025-01-21 VITALS
TEMPERATURE: 98 F | OXYGEN SATURATION: 100 % | DIASTOLIC BLOOD PRESSURE: 62 MMHG | RESPIRATION RATE: 18 BRPM | SYSTOLIC BLOOD PRESSURE: 112 MMHG | HEART RATE: 82 BPM

## 2025-01-21 LAB
ALBUMIN SERPL ELPH-MCNC: 4.2 G/DL — SIGNIFICANT CHANGE UP (ref 3.3–5)
ALP SERPL-CCNC: 69 U/L — SIGNIFICANT CHANGE UP (ref 40–120)
ALT FLD-CCNC: 9 U/L — SIGNIFICANT CHANGE UP (ref 4–33)
ANION GAP SERPL CALC-SCNC: 12 MMOL/L — SIGNIFICANT CHANGE UP (ref 7–14)
APPEARANCE UR: CLEAR — SIGNIFICANT CHANGE UP
AST SERPL-CCNC: 17 U/L — SIGNIFICANT CHANGE UP (ref 4–32)
BASOPHILS # BLD AUTO: 0.04 K/UL — SIGNIFICANT CHANGE UP (ref 0–0.2)
BASOPHILS NFR BLD AUTO: 0.5 % — SIGNIFICANT CHANGE UP (ref 0–2)
BILIRUB SERPL-MCNC: 0.8 MG/DL — SIGNIFICANT CHANGE UP (ref 0.2–1.2)
BILIRUB UR-MCNC: NEGATIVE — SIGNIFICANT CHANGE UP
BLOOD GAS VENOUS COMPREHENSIVE RESULT: SIGNIFICANT CHANGE UP
BUN SERPL-MCNC: 11 MG/DL — SIGNIFICANT CHANGE UP (ref 7–23)
CALCIUM SERPL-MCNC: 9 MG/DL — SIGNIFICANT CHANGE UP (ref 8.4–10.5)
CHLORIDE SERPL-SCNC: 102 MMOL/L — SIGNIFICANT CHANGE UP (ref 98–107)
CO2 SERPL-SCNC: 25 MMOL/L — SIGNIFICANT CHANGE UP (ref 22–31)
COLOR SPEC: YELLOW — SIGNIFICANT CHANGE UP
CREAT SERPL-MCNC: 0.71 MG/DL — SIGNIFICANT CHANGE UP (ref 0.5–1.3)
DIFF PNL FLD: NEGATIVE — SIGNIFICANT CHANGE UP
EGFR: 120 ML/MIN/1.73M2 — SIGNIFICANT CHANGE UP
EOSINOPHIL # BLD AUTO: 0.29 K/UL — SIGNIFICANT CHANGE UP (ref 0–0.5)
EOSINOPHIL NFR BLD AUTO: 3.3 % — SIGNIFICANT CHANGE UP (ref 0–6)
GLUCOSE SERPL-MCNC: 86 MG/DL — SIGNIFICANT CHANGE UP (ref 70–99)
GLUCOSE UR QL: NEGATIVE MG/DL — SIGNIFICANT CHANGE UP
HCG SERPL-ACNC: <1 MIU/ML — SIGNIFICANT CHANGE UP
HCT VFR BLD CALC: 37.7 % — SIGNIFICANT CHANGE UP (ref 34.5–45)
HGB BLD-MCNC: 11.7 G/DL — SIGNIFICANT CHANGE UP (ref 11.5–15.5)
IANC: 5.07 K/UL — SIGNIFICANT CHANGE UP (ref 1.8–7.4)
IMM GRANULOCYTES NFR BLD AUTO: 0.2 % — SIGNIFICANT CHANGE UP (ref 0–0.9)
KETONES UR-MCNC: ABNORMAL MG/DL
LEUKOCYTE ESTERASE UR-ACNC: NEGATIVE — SIGNIFICANT CHANGE UP
LIDOCAIN IGE QN: 43 U/L — SIGNIFICANT CHANGE UP (ref 7–60)
LYMPHOCYTES # BLD AUTO: 2.79 K/UL — SIGNIFICANT CHANGE UP (ref 1–3.3)
LYMPHOCYTES # BLD AUTO: 31.5 % — SIGNIFICANT CHANGE UP (ref 13–44)
MCHC RBC-ENTMCNC: 29.2 PG — SIGNIFICANT CHANGE UP (ref 27–34)
MCHC RBC-ENTMCNC: 31 G/DL — LOW (ref 32–36)
MCV RBC AUTO: 94 FL — SIGNIFICANT CHANGE UP (ref 80–100)
MONOCYTES # BLD AUTO: 0.66 K/UL — SIGNIFICANT CHANGE UP (ref 0–0.9)
MONOCYTES NFR BLD AUTO: 7.4 % — SIGNIFICANT CHANGE UP (ref 2–14)
NEUTROPHILS # BLD AUTO: 5.07 K/UL — SIGNIFICANT CHANGE UP (ref 1.8–7.4)
NEUTROPHILS NFR BLD AUTO: 57.1 % — SIGNIFICANT CHANGE UP (ref 43–77)
NITRITE UR-MCNC: NEGATIVE — SIGNIFICANT CHANGE UP
NRBC # BLD: 0 /100 WBCS — SIGNIFICANT CHANGE UP (ref 0–0)
NRBC # FLD: 0 K/UL — SIGNIFICANT CHANGE UP (ref 0–0)
PH UR: 7 — SIGNIFICANT CHANGE UP (ref 5–8)
PLATELET # BLD AUTO: 297 K/UL — SIGNIFICANT CHANGE UP (ref 150–400)
POTASSIUM SERPL-MCNC: 4.1 MMOL/L — SIGNIFICANT CHANGE UP (ref 3.5–5.3)
POTASSIUM SERPL-SCNC: 4.1 MMOL/L — SIGNIFICANT CHANGE UP (ref 3.5–5.3)
PROT SERPL-MCNC: 7.1 G/DL — SIGNIFICANT CHANGE UP (ref 6–8.3)
PROT UR-MCNC: NEGATIVE MG/DL — SIGNIFICANT CHANGE UP
RBC # BLD: 4.01 M/UL — SIGNIFICANT CHANGE UP (ref 3.8–5.2)
RBC # FLD: 13.1 % — SIGNIFICANT CHANGE UP (ref 10.3–14.5)
SODIUM SERPL-SCNC: 139 MMOL/L — SIGNIFICANT CHANGE UP (ref 135–145)
SP GR SPEC: 1.02 — SIGNIFICANT CHANGE UP (ref 1–1.03)
UROBILINOGEN FLD QL: 1 MG/DL — SIGNIFICANT CHANGE UP (ref 0.2–1)
WBC # BLD: 8.87 K/UL — SIGNIFICANT CHANGE UP (ref 3.8–10.5)
WBC # FLD AUTO: 8.87 K/UL — SIGNIFICANT CHANGE UP (ref 3.8–10.5)

## 2025-01-21 PROCEDURE — 99285 EMERGENCY DEPT VISIT HI MDM: CPT

## 2025-01-21 PROCEDURE — 76830 TRANSVAGINAL US NON-OB: CPT | Mod: 26

## 2025-01-21 PROCEDURE — 93010 ELECTROCARDIOGRAM REPORT: CPT

## 2025-01-21 PROCEDURE — 74177 CT ABD & PELVIS W/CONTRAST: CPT | Mod: 26

## 2025-01-21 RX ORDER — IOHEXOL 350 MG/ML
30 INJECTION, SOLUTION INTRAVENOUS ONCE
Refills: 0 | Status: COMPLETED | OUTPATIENT
Start: 2025-01-21 | End: 2025-01-21

## 2025-01-21 RX ORDER — SODIUM CHLORIDE 9 MG/ML
1000 INJECTION, SOLUTION INTRAMUSCULAR; INTRAVENOUS; SUBCUTANEOUS ONCE
Refills: 0 | Status: COMPLETED | OUTPATIENT
Start: 2025-01-21 | End: 2025-01-21

## 2025-01-21 RX ORDER — OMEPRAZOLE MAGNESIUM 20 MG/1
1 CAPSULE, DELAYED RELEASE ORAL
Qty: 30 | Refills: 0
Start: 2025-01-21 | End: 2025-02-19

## 2025-01-21 RX ORDER — ONDANSETRON 4 MG/1
4 TABLET ORAL ONCE
Refills: 0 | Status: COMPLETED | OUTPATIENT
Start: 2025-01-21 | End: 2025-01-21

## 2025-01-21 RX ORDER — VITAMIN A 10000 UNIT
1 TABLET ORAL
Qty: 30 | Refills: 0
Start: 2025-01-21 | End: 2025-02-19

## 2025-01-21 RX ORDER — THIAMINE HYDROCHLORIDE 100 MG/ML
1 INJECTION, SOLUTION INTRAMUSCULAR; INTRAVENOUS
Qty: 30 | Refills: 0
Start: 2025-01-21 | End: 2025-02-19

## 2025-01-21 RX ORDER — THIAMINE HYDROCHLORIDE 100 MG/ML
100 INJECTION, SOLUTION INTRAMUSCULAR; INTRAVENOUS ONCE
Refills: 0 | Status: COMPLETED | OUTPATIENT
Start: 2025-01-21 | End: 2025-01-21

## 2025-01-21 RX ORDER — VITAMIN A 10000 UNIT
1 TABLET ORAL ONCE
Refills: 0 | Status: COMPLETED | OUTPATIENT
Start: 2025-01-21 | End: 2025-01-21

## 2025-01-21 RX ADMIN — IOHEXOL 30 MILLILITER(S): 350 INJECTION, SOLUTION INTRAVENOUS at 16:31

## 2025-01-21 RX ADMIN — SODIUM CHLORIDE 1000 MILLILITER(S): 9 INJECTION, SOLUTION INTRAMUSCULAR; INTRAVENOUS; SUBCUTANEOUS at 13:30

## 2025-01-21 RX ADMIN — SODIUM CHLORIDE 1000 MILLILITER(S): 9 INJECTION, SOLUTION INTRAMUSCULAR; INTRAVENOUS; SUBCUTANEOUS at 16:31

## 2025-01-21 RX ADMIN — SODIUM CHLORIDE 1000 MILLILITER(S): 9 INJECTION, SOLUTION INTRAMUSCULAR; INTRAVENOUS; SUBCUTANEOUS at 17:53

## 2025-01-21 RX ADMIN — ONDANSETRON 4 MILLIGRAM(S): 4 TABLET ORAL at 13:30

## 2025-01-21 NOTE — ED ADULT NURSE NOTE - NSFALLUNIVINTERV_ED_ALL_ED
Bed/Stretcher in lowest position, wheels locked, appropriate side rails in place/Call bell, personal items and telephone in reach/Instruct patient to call for assistance before getting out of bed/chair/stretcher/Non-slip footwear applied when patient is off stretcher/[place] to call system/Physically safe environment - no spills, clutter or unnecessary equipment/Purposeful proactive rounding/Room/bathroom lighting operational, light cord in reach

## 2025-01-21 NOTE — CONSULT NOTE ADULT - SUBJECTIVE AND OBJECTIVE BOX
GENERAL SURGERY CONSULT NOTE  Consulting surgical team: Bariatric Surgery/Acute Care Surgery  Consulting attending: Dr. Wylie    HPI:  24F with PSHx lap sleeve gastrectomy ([facility]don Peter, [place], ), appendectomy presenting to the ER with 3 months of ongoing NBNB emesis and suprapubic discomfort. Patient states that she has emesis almost daily with solids and liquids. Emesis occurs shortly after ingestion. Has never had blood in emesis. Last BM today was normal. Denies dysphagia, odynophagia, chest pain, cough, hoarseness, melena, hematochezia. Last EGD was pre-operatively in preparation for lap sleeve, which reportedly was normal. No history of colonoscopy. States that she has suprapubic pain described as pressure-like with radiation to bilateral flanks as well as epigastric region. Denies fever/chills, diarrhea, constipation, urinary symptoms, vaginal symptoms, weakness or any other concerns.    In ED, patient AVSS, received banana bag, WBC 8.8, lactate 0.4, CTAP without intraabdominal pathology.       PAST MEDICAL HISTORY:  No pertinent past medical history        PAST SURGICAL HISTORY:  No significant past surgical history        MEDICATIONS:  folic acid Injectable 1 milliGRAM(s) IV Push Once  thiamine Injectable 100 milliGRAM(s) IV Push Once      ALLERGIES:  No Known Allergies      VITALS & I/Os:  Vital Signs Last 24 Hrs  T(C): 36.9 (2025 16:55), Max: 36.9 (2025 16:55)  T(F): 98.4 (2025 16:55), Max: 98.4 (2025 16:55)  HR: 80 (2025 16:55) (80 - 108)  BP: 104/55 (2025 16:55) (101/60 - 120/73)  BP(mean): --  RR: 16 (2025 16:55) (15 - 16)  SpO2: 100% (2025 16:55) (100% - 100%)    Parameters below as of 2025 16:55  Patient On (Oxygen Delivery Method): room air        I&O's Summary      PHYSICAL EXAM:  GEN: resting comfortably in bed, in NAD  ABD: soft, non-distended, suprapubic TTP without guarding/rebound tenderness/rigidity, lap incisions well healed  NEURO: AAOx4, no focal neuro deficits; CN II-IX intact    LABS:                        11.7   8.87  )-----------( 297      ( 2025 13:35 )             37.7         139  |  102  |  11  ----------------------------<  86  4.1   |  25  |  0.71    Ca    9.0      2025 13:35    TPro  7.1  /  Alb  4.2  /  TBili  0.8  /  DBili  x   /  AST  17  /  ALT  9   /  AlkPhos  69      Lactate:   @ 13:35  0.9              Urinalysis Basic - ( 2025 13:35 )    Color: Yellow / Appearance: Clear / S.025 / pH: x  Gluc: 86 mg/dL / Ketone: Trace mg/dL  / Bili: Negative / Urobili: 1.0 mg/dL   Blood: x / Protein: Negative mg/dL / Nitrite: Negative   Leuk Esterase: Negative / RBC: x / WBC x   Sq Epi: x / Non Sq Epi: x / Bacteria: x        IMAGING:  < from: CT Abdomen and Pelvis w/ Oral Cont and w/ IV Cont (25 @ 18:21) >  FINDINGS:  LOWER CHEST: Within normal limits.    LIVER: Within normal limits.  BILE DUCTS: Normal caliber.  GALLBLADDER: Within normal limits.  SPLEEN: Within normal limits.  PANCREAS: Within normal limits.  ADRENALS: Within normal limits.  KIDNEYS/URETERS: Within normal limits.    BLADDER: Within normal limits.  REPRODUCTIVE ORGANS: Uterus and adnexa within normal limits.    BOWEL: Status post sleeve gastrectomy. No bowelobstruction. Oral   contrast is seen as distally as the splenic flexure. Appendix is not   visualized. No evidence of inflammation in the pericecal region.  PERITONEUM/RETROPERITONEUM: Trace amount of free pelvic ascites.  VESSELS: Within normal limits.  LYMPH NODES: No lymphadenopathy.  ABDOMINAL WALL: Within normal limits.  BONES: Within normal limits.    IMPRESSION:  No acute pathology.    < end of copied text >

## 2025-01-21 NOTE — ED PROVIDER NOTE - OBJECTIVE STATEMENT
24-year-old female with past medical history gastric sleeve, appendectomy presenting to the ER with 3 months of nausea, vomiting and abdominal pain.  Patient states that she has daily nausea with vomiting.  States that she has pain in the abdomen that began in the suprapubic region described as pressure-like with radiation to bilateral flanks as well as epigastric region.  Patient denies fever/chills, diarrhea, dysuria, urinary frequency, vaginal discharge, chest pain, shortness of breath, weakness or any other concerns. 24-year-old female with past medical history gastric sleeve, appendectomy presenting to the ER with 3 months of nausea, vomiting and abdominal pain.  Patient states that she has daily nausea with vomiting.  States that she has pain in the abdomen that began in the suprapubic region described as pressure-like with radiation to bilateral flanks as well as epigastric region. Reports 1 day of vaginal discharge which she describes as "normal female discharge" although it is new for her, no odor or irritation associated. Patient denies fever/chills, diarrhea, dysuria, urinary frequency, vaginal discharge, chest pain, shortness of breath, weakness or any other concerns.

## 2025-01-21 NOTE — ED PROVIDER NOTE - PRO INTERPRETER NEED 2
Quality 431: Preventive Care And Screening: Unhealthy Alcohol Use - Screening: Patient not identified as an unhealthy alcohol user when screened for unhealthy alcohol use using a systematic screening method Quality 47: Advance Care Plan: Advance Care Planning discussed and documented; advance care plan or surrogate decision maker documented in the medical record. Quality 226: Preventive Care And Screening: Tobacco Use: Screening And Cessation Intervention: Patient screened for tobacco use and is an ex/non-smoker Detail Level: Detailed English

## 2025-01-21 NOTE — ED ADULT TRIAGE NOTE - AS TEMP SITE
Patient: Aditi Jackson Date: 7/10/2018   : 1950 Attending: Drake Flores MD   68 year old female Allergies: ALLERGIES:   Allergen Reactions   • Metal Topical      Loss of taste buds.   • Shrimp ANAPHYLAXIS   • Lorabid [Loracarbef] RASH and PRURITUS   • Penicillins RASH and PRURITUS   • Sulfa Antibiotics RASH   • Wool Alcohol [Lanolin Alcohol] RASH and PRURITUS     Allergic to wool fibers.        Post-op Day: 1    Operation Procedure: Ureteroscopic Stone Extraction    Chief Complaint: UTI and stone    Subjective/HPI: No complaints. Mild hematuria improving. NO flank pain. Mild headache unchanged.     Vital Last Value 24 Hour Range   Temperature 98.7 °F (37.1 °C) (07/10/18 0325) Temp  Min: 97.3 °F (36.3 °C)  Max: 98.7 °F (37.1 °C)   Pulse 86 (07/10/18 0325) Pulse  Min: 64  Max: 101   Respiratory 16 (07/10/18 0325) Resp  Min: 12  Max: 20   Non-Invasive  Blood Pressure 104/55 (07/10/18 0325) BP  Min: 104/55  Max: 148/72   Arterial   Blood Pressure   No Data Recorded   Pulse Oximetry 95 % (07/10/18 0325) SpO2  Min: 94 %  Max: 100 %     Vital Today Admitted   Weight 88.8 kg (18 1406) Weight: 88.8 kg (18 140)   Height N/A Height: 5' 7\" (170.2 cm) (18 1406)   BMI N/A BMI (Calculated): 30.73 (18 1406)     Intake/Output:    I/O this shift:  In: 720 [P.O.:720]  Out: 1500 [Urine:1500]    I/O last 3 completed shifts:  In: 1610 [P.O.:360; I.V.:1250]  Out: 750 [Urine:750]      Intake/Output Summary (Last 24 hours) at 07/10/18 0618  Last data filed at 07/10/18 0325   Gross per 24 hour   Intake             2330 ml   Output             2250 ml   Net               80 ml       Medications/Infusions:  Scheduled:   • DULoxetine  60 mg Oral Daily   • levothyroxine  175 mcg Oral QAM AC   • ciprofloxacin  500 mg Oral BID Abreakfast & HS       Continuous Infusions:   • lactated ringers infusion 100 mL/hr at 18       Physical Exam:   GENERAL: Alert, oriented times 3, no acute distress.  EYES:  Sclerae non-icteric.  PSYCHIATRIC: Normal mood and affect.  ABDOMEN: Soft, nontender, non-distended, no masses.  BACK: No costovertebral angle tenderness.  Ext: no c/c/e/tend, teds/scds in place, 2+ radial pulse reg rate/rhythm    Laboratory Results:    Lab Results   Component Value Date    WBC 9.2 06/29/2018    HCT 38.9 06/29/2018    HGB 12.5 06/29/2018     06/29/2018    BUN 19 06/29/2018    CREATININE 0.95 06/29/2018    SODIUM 143 06/29/2018    POTASSIUM 5.1 06/29/2018    CHLORIDE 105 06/29/2018    AST 29 06/21/2018    ALKPT 88 06/21/2018    BILIRUBIN 1.0 06/21/2018    CO2 25 06/29/2018    GPT 23 06/21/2018    GLUCOSE 90 06/29/2018    CALCIUM 9.9 06/29/2018       Urinalysis    Lab Results   Component Value Date    USPG 1.015 06/29/2018    UWBC NEGATIVE 06/29/2018    LEUK 1 to 5 06/29/2018    ERYT 11 to 25 06/29/2018    URBC SMALL (A) 06/29/2018    UBILI NEGATIVE 06/29/2018    UPH 6.0 06/29/2018    UBACTR NONE SEEN 06/29/2018       Surgical Care Improvement Project:    [place]: No  DVT/VTE Prophylaxis:   VTE Pharmacologic Prophylaxis: No pharmacologic Venous Thromboembolism prophylaxis due to Patient fully ambulating and deemed to be low risk  VTE Mechanical Prophylaxis: Yes  Antibiotics D/C'd within 24 hrs of surgery end: No,  Antibiotic ordered because patient has an infection or suspected infection    Central Line: No    Diagnosis/Comorbidities/Complications:  POD # 1 R URS with HLL and stent removal  Recent urosespsis    Plans/Recommendations:  1. Discharge  2. Finish cipro today  3. F/u 2-3 weeks.      Discussed with or notes reviewed:  RN and Patient   oral

## 2025-01-21 NOTE — ED PROVIDER NOTE - CLINICAL SUMMARY MEDICAL DECISION MAKING FREE TEXT BOX
24-year-old female with past medical history gastric sleeve, appendectomy presenting to the ER with 3 months of nausea, vomiting and abdominal pain.  Patient states that she has daily nausea with vomiting.  States that she has pain in the abdomen that began in the suprapubic region described as pressure-like with radiation to bilateral flanks as well as epigastric region.  No fever/chills, diarrhea, urinary complaints. Reports 1 day of vaginal discharge, no odor, no irritation. On exam pt is well appearing, afebrile, +epigastric and lower abdominal tenderness. Concern for uti, less likely pyelo, ovarian cyst, PUD, less likely internal hernia, colitis. Plan: cbc/cmp, hcg, TVUS, CT abd/pelvis, IV fluids, trial zofran, pt will likely require surgery consult given hx of gastric sleeve

## 2025-01-21 NOTE — ED PROVIDER NOTE - PROGRESS NOTE DETAILS
BISHNU Jackson: CT without acute abnormality. Given hx of gastric sleeve, surgery consulted. Pt signed out awaiting consult.  Surgery requested thiamine and folate IV. BISHNU Luu: Received sign out.  tolerated po.  cleared for discharge.  Discussed treating for PID due to vaginal discharge, pt does not want to at this time, will follow up with culture results.

## 2025-01-21 NOTE — ED ADULT NURSE REASSESSMENT NOTE - NS ED NURSE REASSESS COMMENT FT1
Break coverage RN. Patient back from US. Patient A&Ox4, respirations even and unlabored. states improvement in condition. vitals stable. Patient pending US results. Stretcher in lowest position, wheels locked, appropriate side rails in place, call bell in reach.

## 2025-01-21 NOTE — ED ADULT TRIAGE NOTE - CHIEF COMPLAINT QUOTE
Pt c/o b/l pelvic pain, nausea, vomiting and b/l flank pain x3 months. Pt reports she was seen here previously for the same symptoms, but symptoms have not resolved. LMP 1/ 15. Pt denies any chest pain, sob, dizziness, fever or urinary symptoms. Denies any PMHx

## 2025-01-21 NOTE — ED PROVIDER NOTE - PATIENT PORTAL LINK FT
You can access the FollowMyHealth Patient Portal offered by [facility] by registering at the following website: [email or web address]. By joining [facility]’s FollowMyHealth portal, you will also be able to view your health information using other applications (apps) compatible with our system.

## 2025-01-21 NOTE — CONSULT NOTE ADULT - ASSESSMENT
24F with PSHx lap sleeve gastrectomy and appendectomy presenting with 3 months of ongoing NBNB emesis and suprapubic discomfort. Bariatric Surgery consulted in s/o abdominal pain and h/o lap sleeve gastrectomy.    Clinical, laboratory and radiographic findings without acute pathology related to sleeve gastrectomy. Patient tolerated PO contrast without emesis and demonstrates passage of contrast to large colon. No signs of acute obstruction. CTAP otherwise unremarkable. History and presentation c/w likely gastric motility issue vs GERD vs peptic ulcer disease.      Recommendations:  - PO challenge  - Start PPI QD  - Would benefit from PCP and GI outpatient evaluation and work-up   - Provide patient with [facility] Bariatric Surgery contact information   - Dispo per ED  - Call back with any questions/concerns    D/w Dr. Benton Team, [phone]

## 2025-01-21 NOTE — ED ADULT NURSE NOTE - OBJECTIVE STATEMENT
patient alert ox4 came in c/o pelvic pain since 2 months with nausea. was seen before with no relief, labs done and meds given as ordered. awaiting for Us and results.

## 2025-01-21 NOTE — ED PROVIDER NOTE - NSFOLLOWUPINSTRUCTIONS_ED_ALL_ED_FT
take omeprazole 40mg once a day.  Take folic acid 1mg once a day.  Thaimine 100mg once a day.  Follow up with your bariatric surgeon within 1-2 weeks  Follow up with your PCP in 2 days,  Return to ED for any worsening pain, vomiting, fever.

## 2025-01-22 LAB
C TRACH RRNA SPEC QL NAA+PROBE: SIGNIFICANT CHANGE UP
CULTURE RESULTS: SIGNIFICANT CHANGE UP
SPECIMEN SOURCE: SIGNIFICANT CHANGE UP
SPECIMEN SOURCE: SIGNIFICANT CHANGE UP

## 2025-04-09 ENCOUNTER — APPOINTMENT (OUTPATIENT)
Dept: HUMAN REPRODUCTION | Facility: CLINIC | Age: 25
End: 2025-04-09

## 2025-05-07 ENCOUNTER — APPOINTMENT (OUTPATIENT)
Dept: HUMAN REPRODUCTION | Facility: CLINIC | Age: 25
End: 2025-05-07

## 2025-05-28 ENCOUNTER — RESULT REVIEW (OUTPATIENT)
Age: 25
End: 2025-05-28

## 2025-05-28 ENCOUNTER — NON-APPOINTMENT (OUTPATIENT)
Age: 25
End: 2025-05-28

## 2025-05-28 ENCOUNTER — APPOINTMENT (OUTPATIENT)
Dept: CARE COORDINATION | Facility: HOME HEALTH | Age: 25
End: 2025-05-28

## 2025-05-28 ENCOUNTER — OUTPATIENT (OUTPATIENT)
Dept: OUTPATIENT SERVICES | Facility: HOSPITAL | Age: 25
LOS: 1 days | End: 2025-05-28

## 2025-05-28 ENCOUNTER — APPOINTMENT (OUTPATIENT)
Dept: OBGYN | Facility: HOSPITAL | Age: 25
End: 2025-05-28
Payer: MEDICAID

## 2025-05-28 VITALS
SYSTOLIC BLOOD PRESSURE: 107 MMHG | HEART RATE: 66 BPM | DIASTOLIC BLOOD PRESSURE: 66 MMHG | WEIGHT: 184 LBS | HEIGHT: 65 IN | TEMPERATURE: 97.9 F | BODY MASS INDEX: 30.66 KG/M2

## 2025-05-28 DIAGNOSIS — Z86.59 PERSONAL HISTORY OF OTHER MENTAL AND BEHAVIORAL DISORDERS: ICD-10-CM

## 2025-05-28 DIAGNOSIS — G43.909 MIGRAINE, UNSPECIFIED, NOT INTRACTABLE, W/OUT STATUS MIGRAINOSUS: ICD-10-CM

## 2025-05-28 DIAGNOSIS — Z86.39 PERSONAL HISTORY OF OTHER ENDOCRINE, NUTRITIONAL AND METABOLIC DISEASE: ICD-10-CM

## 2025-05-28 DIAGNOSIS — Z00.00 ENCOUNTER FOR GENERAL ADULT MEDICAL EXAMINATION W/OUT ABNORMAL FINDINGS: ICD-10-CM

## 2025-05-28 DIAGNOSIS — Z34.90 ENCOUNTER FOR SUPERVISION OF NORMAL PREGNANCY, UNSPECIFIED, UNSPECIFIED TRIMESTER: ICD-10-CM

## 2025-05-28 LAB
ALBUMIN SERPL ELPH-MCNC: 4 G/DL — SIGNIFICANT CHANGE UP (ref 3.3–5)
ALP SERPL-CCNC: 60 U/L — SIGNIFICANT CHANGE UP (ref 40–120)
ALT FLD-CCNC: 12 U/L — SIGNIFICANT CHANGE UP (ref 4–33)
ANION GAP SERPL CALC-SCNC: 12 MMOL/L — SIGNIFICANT CHANGE UP (ref 7–14)
APPEARANCE UR: CLEAR — SIGNIFICANT CHANGE UP
AST SERPL-CCNC: 13 U/L — SIGNIFICANT CHANGE UP (ref 4–32)
BACTERIA # UR AUTO: ABNORMAL /HPF
BASOPHILS # BLD AUTO: 0.02 K/UL — SIGNIFICANT CHANGE UP (ref 0–0.2)
BASOPHILS NFR BLD AUTO: 0.2 % — SIGNIFICANT CHANGE UP (ref 0–2)
BILIRUB SERPL-MCNC: 0.7 MG/DL — SIGNIFICANT CHANGE UP (ref 0.2–1.2)
BILIRUB UR-MCNC: NEGATIVE — SIGNIFICANT CHANGE UP
BUN SERPL-MCNC: 7 MG/DL — SIGNIFICANT CHANGE UP (ref 7–23)
CALCIUM SERPL-MCNC: 8.7 MG/DL — SIGNIFICANT CHANGE UP (ref 8.4–10.5)
CAST: 0 /LPF — SIGNIFICANT CHANGE UP (ref 0–4)
CHLORIDE SERPL-SCNC: 102 MMOL/L — SIGNIFICANT CHANGE UP (ref 98–107)
CO2 SERPL-SCNC: 21 MMOL/L — LOW (ref 22–31)
COLOR SPEC: YELLOW — SIGNIFICANT CHANGE UP
CREAT SERPL-MCNC: 0.5 MG/DL — SIGNIFICANT CHANGE UP (ref 0.5–1.3)
DIFF PNL FLD: NEGATIVE — SIGNIFICANT CHANGE UP
EGFR: 133 ML/MIN/1.73M2 — SIGNIFICANT CHANGE UP
EGFR: 133 ML/MIN/1.73M2 — SIGNIFICANT CHANGE UP
EOSINOPHIL # BLD AUTO: 0.25 K/UL — SIGNIFICANT CHANGE UP (ref 0–0.5)
EOSINOPHIL NFR BLD AUTO: 2.3 % — SIGNIFICANT CHANGE UP (ref 0–6)
GLUCOSE 1H P MEAL SERPL-MCNC: 95 MG/DL — SIGNIFICANT CHANGE UP (ref 70–134)
GLUCOSE SERPL-MCNC: 99 MG/DL — SIGNIFICANT CHANGE UP (ref 70–99)
GLUCOSE UR QL: NEGATIVE MG/DL — SIGNIFICANT CHANGE UP
HCT VFR BLD CALC: 36.7 % — SIGNIFICANT CHANGE UP (ref 34.5–45)
HGB BLD-MCNC: 12 G/DL — SIGNIFICANT CHANGE UP (ref 11.5–15.5)
HIV 1+2 AB+HIV1 P24 AG SERPL QL IA: SIGNIFICANT CHANGE UP
IANC: 7.69 K/UL — HIGH (ref 1.8–7.4)
IMM GRANULOCYTES NFR BLD AUTO: 0.4 % — SIGNIFICANT CHANGE UP (ref 0–0.9)
KETONES UR QL: NEGATIVE MG/DL — SIGNIFICANT CHANGE UP
LEUKOCYTE ESTERASE UR-ACNC: ABNORMAL
LYMPHOCYTES # BLD AUTO: 18.9 % — SIGNIFICANT CHANGE UP (ref 13–44)
LYMPHOCYTES # BLD AUTO: 2.04 K/UL — SIGNIFICANT CHANGE UP (ref 1–3.3)
MCHC RBC-ENTMCNC: 29.8 PG — SIGNIFICANT CHANGE UP (ref 27–34)
MCHC RBC-ENTMCNC: 32.7 G/DL — SIGNIFICANT CHANGE UP (ref 32–36)
MCV RBC AUTO: 91.1 FL — SIGNIFICANT CHANGE UP (ref 80–100)
MONOCYTES # BLD AUTO: 0.77 K/UL — SIGNIFICANT CHANGE UP (ref 0–0.9)
MONOCYTES NFR BLD AUTO: 7.1 % — SIGNIFICANT CHANGE UP (ref 2–14)
NEUTROPHILS # BLD AUTO: 7.69 K/UL — HIGH (ref 1.8–7.4)
NEUTROPHILS NFR BLD AUTO: 71.1 % — SIGNIFICANT CHANGE UP (ref 43–77)
NITRITE UR-MCNC: NEGATIVE — SIGNIFICANT CHANGE UP
NRBC # BLD AUTO: 0 K/UL — SIGNIFICANT CHANGE UP (ref 0–0)
NRBC # FLD: 0 K/UL — SIGNIFICANT CHANGE UP (ref 0–0)
NRBC BLD AUTO-RTO: 0 /100 WBCS — SIGNIFICANT CHANGE UP (ref 0–0)
PH UR: 7.5 — SIGNIFICANT CHANGE UP (ref 5–8)
PLATELET # BLD AUTO: 251 K/UL — SIGNIFICANT CHANGE UP (ref 150–400)
POTASSIUM SERPL-MCNC: 3.4 MMOL/L — LOW (ref 3.5–5.3)
POTASSIUM SERPL-SCNC: 3.4 MMOL/L — LOW (ref 3.5–5.3)
PROT SERPL-MCNC: 7.5 G/DL — SIGNIFICANT CHANGE UP (ref 6–8.3)
PROT UR-MCNC: NEGATIVE MG/DL — SIGNIFICANT CHANGE UP
RBC # BLD: 4.03 M/UL — SIGNIFICANT CHANGE UP (ref 3.8–5.2)
RBC # FLD: 14 % — SIGNIFICANT CHANGE UP (ref 10.3–14.5)
RBC CASTS # UR COMP ASSIST: 2 /HPF — SIGNIFICANT CHANGE UP (ref 0–4)
REVIEW: SIGNIFICANT CHANGE UP
SODIUM SERPL-SCNC: 135 MMOL/L — SIGNIFICANT CHANGE UP (ref 135–145)
SP GR SPEC: 1.02 — SIGNIFICANT CHANGE UP (ref 1–1.03)
SQUAMOUS # UR AUTO: 5 /HPF — SIGNIFICANT CHANGE UP (ref 0–5)
URATE SERPL-MCNC: 2.6 MG/DL — SIGNIFICANT CHANGE UP (ref 2.5–7)
UROBILINOGEN FLD QL: 1 MG/DL — SIGNIFICANT CHANGE UP (ref 0.2–1)
WBC # BLD: 10.81 K/UL — HIGH (ref 3.8–10.5)
WBC # FLD AUTO: 10.81 K/UL — HIGH (ref 3.8–10.5)
WBC UR QL: 1 /HPF — SIGNIFICANT CHANGE UP (ref 0–5)

## 2025-05-28 PROCEDURE — 99213 OFFICE O/P EST LOW 20 MIN: CPT | Mod: TH,25

## 2025-05-28 PROCEDURE — ZZZZZ: CPT

## 2025-05-28 PROCEDURE — 76815 OB US LIMITED FETUS(S): CPT | Mod: 26

## 2025-05-29 DIAGNOSIS — Z34.90 ENCOUNTER FOR SUPERVISION OF NORMAL PREGNANCY, UNSPECIFIED, UNSPECIFIED TRIMESTER: ICD-10-CM

## 2025-05-29 LAB
CULTURE RESULTS: SIGNIFICANT CHANGE UP
HBV SURFACE AG SER-ACNC: SIGNIFICANT CHANGE UP
HCV AB S/CO SERPL IA: 0.14 S/CO — SIGNIFICANT CHANGE UP (ref 0–0.79)
HCV AB SERPL-IMP: SIGNIFICANT CHANGE UP
LEAD BLD-MCNC: <1 UG/DL — SIGNIFICANT CHANGE UP (ref 0–3.4)
MEV IGG SER-ACNC: 73.8 AU/ML — SIGNIFICANT CHANGE UP
MEV IGG+IGM SER-IMP: POSITIVE — SIGNIFICANT CHANGE UP
RUBV IGG SER-ACNC: 1.23 INDEX — SIGNIFICANT CHANGE UP
RUBV IGG SER-IMP: POSITIVE — SIGNIFICANT CHANGE UP
SPECIMEN SOURCE: SIGNIFICANT CHANGE UP
T PALLIDUM AB TITR SER: NEGATIVE — SIGNIFICANT CHANGE UP
VZV IGG FLD QL IA: 1.49 S/CO — SIGNIFICANT CHANGE UP
VZV IGG FLD QL IA: POSITIVE — SIGNIFICANT CHANGE UP

## 2025-05-31 LAB
GAMMA INTERFERON BACKGROUND BLD IA-ACNC: 0.02 IU/ML — SIGNIFICANT CHANGE UP
M TB IFN-G BLD-IMP: NEGATIVE — SIGNIFICANT CHANGE UP
M TB IFN-G CD4+ BCKGRND COR BLD-ACNC: 0 IU/ML — SIGNIFICANT CHANGE UP
M TB IFN-G CD4+CD8+ BCKGRND COR BLD-ACNC: 0 IU/ML — SIGNIFICANT CHANGE UP
QUANT TB PLUS MITOGEN MINUS NIL: 5.94 IU/ML — SIGNIFICANT CHANGE UP

## 2025-06-01 LAB — CYTOLOGY SPEC DOC CYTO: SIGNIFICANT CHANGE UP

## 2025-06-02 ENCOUNTER — EMERGENCY (EMERGENCY)
Facility: HOSPITAL | Age: 25
LOS: 1 days | End: 2025-06-02
Attending: EMERGENCY MEDICINE | Admitting: EMERGENCY MEDICINE
Payer: MEDICAID

## 2025-06-02 VITALS
HEART RATE: 70 BPM | HEIGHT: 65 IN | DIASTOLIC BLOOD PRESSURE: 68 MMHG | SYSTOLIC BLOOD PRESSURE: 102 MMHG | WEIGHT: 182.98 LBS | OXYGEN SATURATION: 100 % | TEMPERATURE: 98 F | RESPIRATION RATE: 18 BRPM

## 2025-06-02 LAB
ALBUMIN SERPL ELPH-MCNC: 3.5 G/DL — SIGNIFICANT CHANGE UP (ref 3.3–5)
ALP SERPL-CCNC: 56 U/L — SIGNIFICANT CHANGE UP (ref 40–120)
ALT FLD-CCNC: 11 U/L — SIGNIFICANT CHANGE UP (ref 4–33)
ANION GAP SERPL CALC-SCNC: 14 MMOL/L — SIGNIFICANT CHANGE UP (ref 7–14)
APPEARANCE UR: ABNORMAL
AST SERPL-CCNC: 39 U/L — HIGH (ref 4–32)
BACTERIA # UR AUTO: ABNORMAL /HPF
BASOPHILS # BLD AUTO: 0.03 K/UL — SIGNIFICANT CHANGE UP (ref 0–0.2)
BASOPHILS NFR BLD AUTO: 0.2 % — SIGNIFICANT CHANGE UP (ref 0–2)
BILIRUB SERPL-MCNC: 0.5 MG/DL — SIGNIFICANT CHANGE UP (ref 0.2–1.2)
BILIRUB UR-MCNC: NEGATIVE — SIGNIFICANT CHANGE UP
BUN SERPL-MCNC: 13 MG/DL — SIGNIFICANT CHANGE UP (ref 7–23)
CALCIUM SERPL-MCNC: 8.6 MG/DL — SIGNIFICANT CHANGE UP (ref 8.4–10.5)
CAST: 0 /LPF — SIGNIFICANT CHANGE UP (ref 0–4)
CHLORIDE SERPL-SCNC: 103 MMOL/L — SIGNIFICANT CHANGE UP (ref 98–107)
CO2 SERPL-SCNC: 13 MMOL/L — LOW (ref 22–31)
COLOR SPEC: YELLOW — SIGNIFICANT CHANGE UP
CREAT SERPL-MCNC: 0.71 MG/DL — SIGNIFICANT CHANGE UP (ref 0.5–1.3)
DIFF PNL FLD: ABNORMAL
EGFR: 120 ML/MIN/1.73M2 — SIGNIFICANT CHANGE UP
EGFR: 120 ML/MIN/1.73M2 — SIGNIFICANT CHANGE UP
EOSINOPHIL # BLD AUTO: 0.34 K/UL — SIGNIFICANT CHANGE UP (ref 0–0.5)
EOSINOPHIL NFR BLD AUTO: 2.6 % — SIGNIFICANT CHANGE UP (ref 0–6)
GLUCOSE SERPL-MCNC: 90 MG/DL — SIGNIFICANT CHANGE UP (ref 70–99)
GLUCOSE UR QL: NEGATIVE MG/DL — SIGNIFICANT CHANGE UP
HCG SERPL-ACNC: SIGNIFICANT CHANGE UP MIU/ML
HCT VFR BLD CALC: 36.7 % — SIGNIFICANT CHANGE UP (ref 34.5–45)
HGB BLD-MCNC: 12 G/DL — SIGNIFICANT CHANGE UP (ref 11.5–15.5)
IANC: 8.71 K/UL — HIGH (ref 1.8–7.4)
IMM GRANULOCYTES NFR BLD AUTO: 0.4 % — SIGNIFICANT CHANGE UP (ref 0–0.9)
KETONES UR QL: ABNORMAL MG/DL
LEUKOCYTE ESTERASE UR-ACNC: ABNORMAL
LYMPHOCYTES # BLD AUTO: 2.97 K/UL — SIGNIFICANT CHANGE UP (ref 1–3.3)
LYMPHOCYTES # BLD AUTO: 22.8 % — SIGNIFICANT CHANGE UP (ref 13–44)
MCHC RBC-ENTMCNC: 30 PG — SIGNIFICANT CHANGE UP (ref 27–34)
MCHC RBC-ENTMCNC: 32.7 G/DL — SIGNIFICANT CHANGE UP (ref 32–36)
MCV RBC AUTO: 91.8 FL — SIGNIFICANT CHANGE UP (ref 80–100)
MONOCYTES # BLD AUTO: 0.91 K/UL — HIGH (ref 0–0.9)
MONOCYTES NFR BLD AUTO: 7 % — SIGNIFICANT CHANGE UP (ref 2–14)
NEUTROPHILS # BLD AUTO: 8.71 K/UL — HIGH (ref 1.8–7.4)
NEUTROPHILS NFR BLD AUTO: 67 % — SIGNIFICANT CHANGE UP (ref 43–77)
NITRITE UR-MCNC: NEGATIVE — SIGNIFICANT CHANGE UP
NRBC # BLD AUTO: 0 K/UL — SIGNIFICANT CHANGE UP (ref 0–0)
NRBC # FLD: 0 K/UL — SIGNIFICANT CHANGE UP (ref 0–0)
NRBC BLD AUTO-RTO: 0 /100 WBCS — SIGNIFICANT CHANGE UP (ref 0–0)
PH UR: 6 — SIGNIFICANT CHANGE UP (ref 5–8)
PLATELET # BLD AUTO: 230 K/UL — SIGNIFICANT CHANGE UP (ref 150–400)
POTASSIUM SERPL-MCNC: 5.6 MMOL/L — HIGH (ref 3.5–5.3)
POTASSIUM SERPL-SCNC: 5.6 MMOL/L — HIGH (ref 3.5–5.3)
PROT SERPL-MCNC: 6.9 G/DL — SIGNIFICANT CHANGE UP (ref 6–8.3)
PROT UR-MCNC: SIGNIFICANT CHANGE UP MG/DL
RBC # BLD: 4 M/UL — SIGNIFICANT CHANGE UP (ref 3.8–5.2)
RBC # FLD: 13.7 % — SIGNIFICANT CHANGE UP (ref 10.3–14.5)
RBC CASTS # UR COMP ASSIST: 2 /HPF — SIGNIFICANT CHANGE UP (ref 0–4)
REVIEW: SIGNIFICANT CHANGE UP
SODIUM SERPL-SCNC: 130 MMOL/L — LOW (ref 135–145)
SP GR SPEC: 1.03 — SIGNIFICANT CHANGE UP (ref 1–1.03)
SQUAMOUS # UR AUTO: 9 /HPF — HIGH (ref 0–5)
UROBILINOGEN FLD QL: 1 MG/DL — SIGNIFICANT CHANGE UP (ref 0.2–1)
WBC # BLD: 13.01 K/UL — HIGH (ref 3.8–10.5)
WBC # FLD AUTO: 13.01 K/UL — HIGH (ref 3.8–10.5)
WBC UR QL: 38 /HPF — HIGH (ref 0–5)

## 2025-06-02 PROCEDURE — 99285 EMERGENCY DEPT VISIT HI MDM: CPT

## 2025-06-02 RX ORDER — CEPHALEXIN 250 MG/1
1 CAPSULE ORAL
Qty: 28 | Refills: 0
Start: 2025-06-02 | End: 2025-06-08

## 2025-06-02 NOTE — ED PROVIDER NOTE - PATIENT PORTAL LINK FT
You can access the FollowMyHealth Patient Portal offered by [facility] by registering at the following website: [email or web address]. By joining [employer]’s FollowMyHealth portal, you will also be able to view your health information using other applications (apps) compatible with our system.

## 2025-06-02 NOTE — ED ADULT TRIAGE NOTE - CHIEF COMPLAINT QUOTE
pt c/o vaginal bleeding and cramping since today.  pt is 9 weeks pregnant. with confirmed IUP.  Hx:  denies

## 2025-06-02 NOTE — ED ADULT NURSE NOTE - HISTORY OF COVID-19 VACCINATION
ED Provider Note    Scribed for Morgan Hanson M.D. by Morgan Hanson M.D.. 6/12/2021,  10:15 PM.    CHIEF COMPLAINT  Chief Complaint   Patient presents with   • Migraine   • N/V       HPI  Pooja Fletcher is a 42 y.o. female who presents to the Emergency Department for her typical migraine syndrome.  She says that she has not had a migraine in 6 weeks, and knew that she was out of her Imitrex when they started this trip that they are on, but was not able to fill one of her available refills at home in [place], and decided to chance it.  She does recognize a pattern of something about traveling to [place] triggering migraine headaches.  She says that she woke up with this diffuse throbbing headache, and it progressed throughout the day to light sensitivity with nausea and vomiting.  She did not hit her head.  This was not a thunderclap headache.  This headache did not respond to Excedrin Migraine 2 doses today.  She is fully alert and oriented, shows slight photophobia, but is pleasant and cooperative in no distress with no signs of focal neurologic deficit or altered mental status, and has normal vital signs, and denies any recent fevers or chills, nausea or vomiting before this migraine syndrome, cough, chest pain, or any other flulike illness symptoms.    REVIEW OF SYSTEMS  See HPI for further details. All other systems are negative.     PAST MEDICAL HISTORY   has a past medical history of Migraine.    SOCIAL HISTORY  Social History     Tobacco Use   • Smoking status: Never Smoker   • Smokeless tobacco: Never Used   Vaping Use   • Vaping Use: Never used   Substance and Sexual Activity   • Alcohol use: Not Currently   • Drug use: Never   • Sexual activity: Not on file     Social History     Substance and Sexual Activity   Drug Use Never       SURGICAL HISTORY   has a past surgical history that includes tonsillectomy.    CURRENT MEDICATIONS  Home Medications     Reviewed by Elo Rao R.N.  "(Registered Nurse) on 06/12/21 at 2153  Med List Status: Not Addressed   Medication Last Dose Status   SUMAtriptan (IMITREX) 50 MG Tab 4/28/2021 Active                ALLERGIES  No Known Allergies    PHYSICAL EXAM  VITAL SIGNS: /109   Pulse 79   Temp 36.4 °C (97.5 °F) (Temporal)   Resp 18   Ht 1.676 m (5' 6\")   Wt 81.1 kg (178 lb 12.7 oz)   SpO2 96%   BMI 28.86 kg/m²   Pulse ox interpretation: I interpret this pulse ox as normal.  Constitutional: Alert in mild distress.  HENT: No signs of trauma, Bilateral external ears normal, Nose normal.   Eyes: Conjunctiva normal, Non-icteric.   Neck: Normal range of motion, Supple, No stridor.   Lymphatic: No lymphadenopathy noted.   Cardiovascular: Regular rate and rhythm, no murmurs.   Thorax & Lungs: Normal breath sounds, No respiratory distress, No wheezing, No chest tenderness.   Abdomen: Bowel sounds normal, Soft, No tenderness, No masses, No pulsatile masses. No peritoneal signs.  Skin: Warm, Dry, No erythema, No rash.   Extremities: Intact distal pulses, No edema, No cyanosis.  Musculoskeletal: Good range of motion in all major joints. No or major deformities noted.   Neurologic: Alert , Normal motor function, Normal sensory function, No focal deficits noted.   Psychiatric: Affect normal, Judgment normal, Mood normal.     DIAGNOSTIC STUDIES / PROCEDURES      COURSE & MEDICAL DECISION MAKING  Nursing notes, VS, PMSFHx reviewed in chart.     10:15 PM Patient seen and examined at bedside.  She has her usual migraine syndrome, but ended up traveling without her Imitrex available, which she says almost always completely aborts her headaches.  She came here because without her Imitrex, when her headache did not respond to Excedrin Migraine, she was feeling too uncomfortable to travel home, where she does have Imitrex refills available.  There is no reason to suspect a secondary headache.  She will be treated with a migraine cocktail.  We discussed that we should " discharge her as she begins to feel better, as she is staying nearby at her parents house, and she will feel most comfortable and optimize her improvement in recovery if she can rest in a dark quiet comfortable place as soon as possible.    11:43 PM this patient felt almost completely better after medications, and was discharged in improved condition.     The patient will return for new or worsening symptoms and is stable at the time of discharge.    The patient is referred to a primary physician for blood pressure management, diabetic screening, and for all other preventative health concerns.    DISPOSITION:  Patient will be discharged home in stable condition.    FOLLOW UP:  Your primary care doctor      As needed      OUTPATIENT MEDICATIONS:  New Prescriptions    No medications on file       FINAL IMPRESSION  1. Migraine with aura and without status migrainosus, not intractable         Vaccine status unknown

## 2025-06-02 NOTE — ED PROVIDER NOTE - CLINICAL SUMMARY MEDICAL DECISION MAKING FREE TEXT BOX
The patient is well-appearing with minimal spotting that began today,.this is the second episode that she has had in a few days associated with mild pelvic cramping.  Bedside ultrasound showing fetal heart rate of 180 bpm and a very small subchorionic hemorrhage.  The patient is Rh+ so will not require RhoGAM.  Will give her a prescription for Keflex for possible UTI.  And have her follow-up with GYN as an outpatient.

## 2025-06-02 NOTE — ED PROVIDER NOTE - NSFOLLOWUPINSTRUCTIONS_ED_ALL_ED_FT
Advance activity as tolerated.  Continue all previously prescribed medications as directed unless otherwise instructed.  Follow up with your OB in 48-72 hours- bring copies of your results.  Return to the ER for worsening or persistent symptoms, and/or ANY NEW OR CONCERNING SYMPTOMS. If you have issues obtaining follow up, please call: [phone] to obtain a doctor or specialist who takes your insurance in your area.  You may call [phone] to make an appointment with the internal medicine clinic.

## 2025-06-02 NOTE — ED PROVIDER NOTE - OBJECTIVE STATEMENT
24-year-old G1, P0 close last menstrual period was on March 29 with prior confirmatory ultrasound 2 days ago when she began to spot for the first time during this pregnancy.  The patient says that the spotting had stopped but restarted again today with some lower pelvic cramping.  Denies fever, chills, shortness of breath, palpitations, nausea, vomiting, dysuria.

## 2025-06-03 PROCEDURE — 76815 OB US LIMITED FETUS(S): CPT | Mod: 26

## 2025-06-04 LAB
CULTURE RESULTS: SIGNIFICANT CHANGE UP
SPECIMEN SOURCE: SIGNIFICANT CHANGE UP

## 2025-06-18 ENCOUNTER — OUTPATIENT (OUTPATIENT)
Dept: OUTPATIENT SERVICES | Facility: HOSPITAL | Age: 25
LOS: 1 days | End: 2025-06-18

## 2025-06-18 ENCOUNTER — APPOINTMENT (OUTPATIENT)
Dept: CARE COORDINATION | Facility: HOME HEALTH | Age: 25
End: 2025-06-18

## 2025-06-18 ENCOUNTER — APPOINTMENT (OUTPATIENT)
Dept: OBGYN | Facility: HOSPITAL | Age: 25
End: 2025-06-18
Payer: MEDICAID

## 2025-06-18 VITALS
DIASTOLIC BLOOD PRESSURE: 65 MMHG | SYSTOLIC BLOOD PRESSURE: 114 MMHG | BODY MASS INDEX: 30.49 KG/M2 | WEIGHT: 183 LBS | TEMPERATURE: 97.7 F | HEART RATE: 68 BPM | HEIGHT: 65 IN

## 2025-06-18 DIAGNOSIS — Z34.90 ENCOUNTER FOR SUPERVISION OF NORMAL PREGNANCY, UNSPECIFIED, UNSPECIFIED TRIMESTER: ICD-10-CM

## 2025-06-18 PROCEDURE — ZZZZZ: CPT

## 2025-06-18 PROCEDURE — 99213 OFFICE O/P EST LOW 20 MIN: CPT | Mod: TH,25

## 2025-06-18 RX ORDER — ASPIRIN 81 MG/1
81 TABLET, DELAYED RELEASE ORAL DAILY
Qty: 1 | Refills: 4 | Status: ACTIVE | COMMUNITY
Start: 2025-06-18 | End: 1900-01-01

## 2025-06-20 ENCOUNTER — APPOINTMENT (OUTPATIENT)
Dept: MATERNAL FETAL MEDICINE | Facility: HOSPITAL | Age: 25
End: 2025-06-20
Payer: MEDICAID

## 2025-06-20 ENCOUNTER — ASOB RESULT (OUTPATIENT)
Age: 25
End: 2025-06-20

## 2025-06-20 PROCEDURE — 76813 OB US NUCHAL MEAS 1 GEST: CPT | Mod: 26

## 2025-06-20 PROCEDURE — 76801 OB US < 14 WKS SINGLE FETUS: CPT | Mod: 26,59

## 2025-07-08 ENCOUNTER — APPOINTMENT (OUTPATIENT)
Dept: ANTEPARTUM | Facility: CLINIC | Age: 25
End: 2025-07-08

## 2025-07-08 ENCOUNTER — EMERGENCY (EMERGENCY)
Facility: HOSPITAL | Age: 25
LOS: 1 days | End: 2025-07-08
Admitting: EMERGENCY MEDICINE
Payer: MEDICAID

## 2025-07-08 ENCOUNTER — NON-APPOINTMENT (OUTPATIENT)
Age: 25
End: 2025-07-08

## 2025-07-08 ENCOUNTER — OUTPATIENT (OUTPATIENT)
Dept: INPATIENT UNIT | Facility: HOSPITAL | Age: 25
LOS: 1 days | End: 2025-07-08
Payer: MEDICAID

## 2025-07-08 VITALS
DIASTOLIC BLOOD PRESSURE: 58 MMHG | RESPIRATION RATE: 16 BRPM | OXYGEN SATURATION: 100 % | SYSTOLIC BLOOD PRESSURE: 99 MMHG | HEART RATE: 77 BPM | TEMPERATURE: 98 F

## 2025-07-08 VITALS — DIASTOLIC BLOOD PRESSURE: 65 MMHG | SYSTOLIC BLOOD PRESSURE: 110 MMHG | HEART RATE: 89 BPM

## 2025-07-08 VITALS
HEIGHT: 65 IN | OXYGEN SATURATION: 98 % | DIASTOLIC BLOOD PRESSURE: 56 MMHG | RESPIRATION RATE: 18 BRPM | SYSTOLIC BLOOD PRESSURE: 112 MMHG | HEART RATE: 102 BPM | TEMPERATURE: 98 F

## 2025-07-08 DIAGNOSIS — Z90.3 ACQUIRED ABSENCE OF STOMACH [PART OF]: Chronic | ICD-10-CM

## 2025-07-08 DIAGNOSIS — Z90.89 ACQUIRED ABSENCE OF OTHER ORGANS: Chronic | ICD-10-CM

## 2025-07-08 DIAGNOSIS — O26.899 OTHER SPECIFIED PREGNANCY RELATED CONDITIONS, UNSPECIFIED TRIMESTER: ICD-10-CM

## 2025-07-08 DIAGNOSIS — Z98.890 OTHER SPECIFIED POSTPROCEDURAL STATES: Chronic | ICD-10-CM

## 2025-07-08 LAB
ALBUMIN SERPL ELPH-MCNC: 3.6 G/DL — SIGNIFICANT CHANGE UP (ref 3.3–5)
ALP SERPL-CCNC: 58 U/L — SIGNIFICANT CHANGE UP (ref 40–120)
ALT FLD-CCNC: 18 U/L — SIGNIFICANT CHANGE UP (ref 4–33)
AMYLASE P1 CFR SERPL: 71 U/L — SIGNIFICANT CHANGE UP (ref 25–125)
ANION GAP SERPL CALC-SCNC: 13 MMOL/L — SIGNIFICANT CHANGE UP (ref 7–14)
APPEARANCE UR: ABNORMAL
AST SERPL-CCNC: 22 U/L — SIGNIFICANT CHANGE UP (ref 4–32)
BACTERIA # UR AUTO: ABNORMAL /HPF
BASOPHILS # BLD AUTO: 0.01 K/UL — SIGNIFICANT CHANGE UP (ref 0–0.2)
BASOPHILS NFR BLD AUTO: 0.1 % — SIGNIFICANT CHANGE UP (ref 0–2)
BILIRUB SERPL-MCNC: 0.3 MG/DL — SIGNIFICANT CHANGE UP (ref 0.2–1.2)
BILIRUB UR-MCNC: NEGATIVE — SIGNIFICANT CHANGE UP
BUN SERPL-MCNC: 9 MG/DL — SIGNIFICANT CHANGE UP (ref 7–23)
CALCIUM SERPL-MCNC: 9.2 MG/DL — SIGNIFICANT CHANGE UP (ref 8.4–10.5)
CAST: 0 /LPF — SIGNIFICANT CHANGE UP (ref 0–4)
CHLORIDE SERPL-SCNC: 102 MMOL/L — SIGNIFICANT CHANGE UP (ref 98–107)
CO2 SERPL-SCNC: 19 MMOL/L — LOW (ref 22–31)
COLOR SPEC: YELLOW — SIGNIFICANT CHANGE UP
CREAT SERPL-MCNC: 0.54 MG/DL — SIGNIFICANT CHANGE UP (ref 0.5–1.3)
DIFF PNL FLD: NEGATIVE — SIGNIFICANT CHANGE UP
EGFR: 130 ML/MIN/1.73M2 — SIGNIFICANT CHANGE UP
EGFR: 130 ML/MIN/1.73M2 — SIGNIFICANT CHANGE UP
EOSINOPHIL # BLD AUTO: 0.3 K/UL — SIGNIFICANT CHANGE UP (ref 0–0.5)
EOSINOPHIL NFR BLD AUTO: 2.5 % — SIGNIFICANT CHANGE UP (ref 0–6)
GLUCOSE SERPL-MCNC: 80 MG/DL — SIGNIFICANT CHANGE UP (ref 70–99)
GLUCOSE UR QL: NEGATIVE MG/DL — SIGNIFICANT CHANGE UP
HCT VFR BLD CALC: 35.3 % — SIGNIFICANT CHANGE UP (ref 34.5–45)
HGB BLD-MCNC: 11.7 G/DL — SIGNIFICANT CHANGE UP (ref 11.5–15.5)
IMM GRANULOCYTES # BLD AUTO: 0.04 K/UL — SIGNIFICANT CHANGE UP (ref 0–0.07)
IMM GRANULOCYTES NFR BLD AUTO: 0.3 % — SIGNIFICANT CHANGE UP (ref 0–0.9)
KETONES UR QL: ABNORMAL MG/DL
LEUKOCYTE ESTERASE UR-ACNC: ABNORMAL
LIDOCAIN IGE QN: 55 U/L — SIGNIFICANT CHANGE UP (ref 7–60)
LYMPHOCYTES # BLD AUTO: 3.03 K/UL — SIGNIFICANT CHANGE UP (ref 1–3.3)
LYMPHOCYTES NFR BLD AUTO: 25.4 % — SIGNIFICANT CHANGE UP (ref 13–44)
MCHC RBC-ENTMCNC: 30.9 PG — SIGNIFICANT CHANGE UP (ref 27–34)
MCHC RBC-ENTMCNC: 33.1 G/DL — SIGNIFICANT CHANGE UP (ref 32–36)
MCV RBC AUTO: 93.1 FL — SIGNIFICANT CHANGE UP (ref 80–100)
MONOCYTES # BLD AUTO: 1.06 K/UL — HIGH (ref 0–0.9)
MONOCYTES NFR BLD AUTO: 8.9 % — SIGNIFICANT CHANGE UP (ref 2–14)
NEUTROPHILS # BLD AUTO: 7.49 K/UL — HIGH (ref 1.8–7.4)
NEUTROPHILS NFR BLD AUTO: 62.8 % — SIGNIFICANT CHANGE UP (ref 43–77)
NITRITE UR-MCNC: NEGATIVE — SIGNIFICANT CHANGE UP
NRBC # BLD AUTO: 0 K/UL — SIGNIFICANT CHANGE UP (ref 0–0)
NRBC # FLD: 0 K/UL — SIGNIFICANT CHANGE UP (ref 0–0)
NRBC BLD AUTO-RTO: 0 /100 WBCS — SIGNIFICANT CHANGE UP (ref 0–0)
PH UR: 6 — SIGNIFICANT CHANGE UP (ref 5–8)
PLATELET # BLD AUTO: 242 K/UL — SIGNIFICANT CHANGE UP (ref 150–400)
PMV BLD: 9.3 FL — SIGNIFICANT CHANGE UP (ref 7–13)
POTASSIUM SERPL-MCNC: 3.9 MMOL/L — SIGNIFICANT CHANGE UP (ref 3.5–5.3)
POTASSIUM SERPL-SCNC: 3.9 MMOL/L — SIGNIFICANT CHANGE UP (ref 3.5–5.3)
PROT SERPL-MCNC: 6.7 G/DL — SIGNIFICANT CHANGE UP (ref 6–8.3)
PROT UR-MCNC: NEGATIVE MG/DL — SIGNIFICANT CHANGE UP
RBC # BLD: 3.79 M/UL — LOW (ref 3.8–5.2)
RBC # FLD: 13 % — SIGNIFICANT CHANGE UP (ref 10.3–14.5)
RBC CASTS # UR COMP ASSIST: 0 /HPF — SIGNIFICANT CHANGE UP (ref 0–4)
REVIEW: SIGNIFICANT CHANGE UP
SODIUM SERPL-SCNC: 134 MMOL/L — LOW (ref 135–145)
SP GR SPEC: 1.02 — SIGNIFICANT CHANGE UP (ref 1–1.03)
SQUAMOUS # UR AUTO: 20 /HPF — HIGH (ref 0–5)
UROBILINOGEN FLD QL: 1 MG/DL — SIGNIFICANT CHANGE UP (ref 0.2–1)
WBC # BLD: 11.93 K/UL — HIGH (ref 3.8–10.5)
WBC # FLD AUTO: 11.93 K/UL — HIGH (ref 3.8–10.5)
WBC UR QL: 43 /HPF — HIGH (ref 0–5)

## 2025-07-08 PROCEDURE — L9996: CPT

## 2025-07-08 PROCEDURE — 99214 OFFICE O/P EST MOD 30 MIN: CPT

## 2025-07-08 RX ORDER — ONDANSETRON HCL/PF 4 MG/2 ML
4 VIAL (ML) INJECTION ONCE
Refills: 0 | Status: COMPLETED | OUTPATIENT
Start: 2025-07-08 | End: 2025-07-08

## 2025-07-08 RX ORDER — SODIUM CHLORIDE 9 G/1000ML
1000 INJECTION, SOLUTION INTRAVENOUS ONCE
Refills: 0 | Status: COMPLETED | OUTPATIENT
Start: 2025-07-08 | End: 2025-07-08

## 2025-07-08 RX ADMIN — SODIUM CHLORIDE 2000 MILLILITER(S): 9 INJECTION, SOLUTION INTRAVENOUS at 20:13

## 2025-07-08 RX ADMIN — Medication 4 MILLIGRAM(S): at 20:14

## 2025-07-08 NOTE — OB RN TRIAGE NOTE - FALL HARM RISK - UNIVERSAL INTERVENTIONS
Bed in lowest position, wheels locked, appropriate side rails in place/Call bell, personal items and telephone in reach/Instruct patient to call for assistance before getting out of bed or chair/Non-slip footwear when patient is out of bed/[place] to call system/Physically safe environment - no spills, clutter or unnecessary equipment/Purposeful Proactive Rounding/Room/bathroom lighting operational, light cord in reach

## 2025-07-08 NOTE — OB PROVIDER TRIAGE NOTE - NSHPPHYSICALEXAM_GEN_ALL_CORE
T(C): 36.7 (07-08-25 @ 19:53), Max: 36.7 (07-08-25 @ 18:25)  HR: 69 (07-08-25 @ 19:52) (69 - 102)  BP: 108/63 (07-08-25 @ 19:52) (99/58 - 112/56)  RR: 16 (07-08-25 @ 19:53) (16 - 18)  SpO2: 100% (07-08-25 @ 18:58) (98% - 100%)    Neuro: No facial asymmetry, no slurred speech, moves all 4 extremities  Mood: Alert and lucid, appropriate mood and affect  Head: Normocephalic. Atraumatic.   Heart: Regular rate and rhythm.  Lungs: No respiratory distress.  Abdomen: Soft, nontender. Gravid.   TAUS: breech, anterior placenta, mmode 142, MVP 2.5. Sono saved in ASOB.   TVUS: CL 3.1cm with no dynamic changes  SSE: No erythema, edema, lesions to external genitalia. Physiologic discharge present. No active, brisk bleeding.  Negative pooling. Cervix appears closed  Exam chaperoned by: CLAUDIA Teran RN

## 2025-07-08 NOTE — OB PROVIDER TRIAGE NOTE - HISTORY OF PRESENT ILLNESS
PNC: PCAP    23 y/o  @15.5 presents with c/o N/V and pelvic pain since thursday. Pt denies dysuria, urgency or frequency. At time of HPI pt reports +FM. Denies LOF, VB, contractions and/ cramping.    Allergies: NKA  Meds: PNV    Antepartum History:  -uncomplicated

## 2025-07-08 NOTE — OB RN TRIAGE NOTE - NS_SOURCEOFINFO_OBGYN_ALL_OB
-- DO NOT REPLY / DO NOT REPLY ALL --  -- This inbox is not monitored. If this was sent to the wrong provider or department, reroute message to [facility] [address] [place] pool. --  -- Message is from Engagement Center Operations ([facility]O) --    General Patient Message: Patient needs Dr. Bong Soto to give him a call back himself because when the Medical assistant called to discuss his lab results it was not successful because of the language barrier. The patient also wants all lab results from 09/30 sent to his home address on file.  Caller Information       Contact Date/Time Type Contact Phone/Fax    10/03/2024 01:07 PM CDT Phone (Incoming) Akua Mithcell (Self) [phone] (M)            Alternative phone number: no    Can a detailed message be left? Yes - Voicemail   Patient has been advised the message will be addressed within 2-3 business days.                 Patient

## 2025-07-08 NOTE — OB PROVIDER TRIAGE NOTE - NSOBPROVIDERNOTE_OBGYN_ALL_OB_FT
Pt is a 24y  encounter for N/V and pelvic pain.  - Discussed with Dr. Benítez  - At time of discharge patient reports she feels better and offers no acute compliants. Maternal and fetal status reassuring. Pt encourage to po hydrate.  - Patient to be discharged home with follow up and return precautions. Fetal kick count instructions given.   - Please follow up with your obstetrician at your next scheduled appointment.   - Please return for decreased/no fetal movement, vaginal bleeding similar to that of a period, leaking/gush of fluid, regular contractions or requiring pain medication   - Patient educated of plan and demonstrate understanding. All questions answered. Discharge instructions provided and signed. Patient verbalized understanding of   discharge plan.  - Discharged at 2110    [facility]

## 2025-07-08 NOTE — ED ADULT TRIAGE NOTE - CHIEF COMPLAINT QUOTE
15 weeks pregnant, DIOMEDES 12/25, pt of MD Talavera- fatigue, n/v, pelvic pain since x 5 days- no pmhx  per Kasey in L&D pt to be seen upstairs

## 2025-07-08 NOTE — OB RN TRIAGE NOTE - AVIAN FLU SYMPTOMS
Render Note In Bullet Format When Appropriate: No Anesthesia Volume In Cc: 0.5 Consent: The patient's consent was obtained including but not limited to risks of crusting, scabbing, blistering, scarring, darker or lighter pigmentary change, recurrence, incomplete removal and infection. Medical Necessity Clause: This procedure was medically necessary because the lesions that were treated were: Post-Care Instructions: I reviewed with the patient in detail post-care instructions. Patient is to wear sunprotection, and avoid picking at any of the treated lesions. Pt may apply Vaseline to crusted or scabbing areas. Detail Level: Detailed Treatment Number (Will Not Render If 0): 0 Medical Necessity Information: It is in your best interest to select a reason for this procedure from the list below. All of these items fulfill various CMS LCD requirements except the new and changing color options. No

## 2025-07-08 NOTE — OB PROVIDER TRIAGE NOTE - NS_DCDISPOSITION_OBGYN_ALL_OB
Home Double Z Plasty Text: The lesion was extirpated to the level of the fat with a #15 scalpel blade. Given the location of the defect, shape of the defect and the proximity to free margins a double Z-plasty was deemed most appropriate for repair. Using a sterile surgical marker, the appropriate transposition arms of the double Z-plasty were drawn incorporating the defect and placing the expected incisions within the relaxed skin tension lines where possible. The area thus outlined was incised deep to adipose tissue with a #15 scalpel blade. The skin margins were undermined to an appropriate distance in all directions utilizing iris scissors. The opposing transposition arms were then transposed and carried over into place in opposite direction and anchored with interrupted buried subcutaneous sutures.

## 2025-07-08 NOTE — OB PROVIDER TRIAGE NOTE - ADDITIONAL INSTRUCTIONS
At time of discharge patient reports she feels better and offers no acute compliants. Maternal and fetal status reassuring. Pt encourage to po hydrate.  - Patient to be discharged home with follow up and return precautions. Fetal kick count instructions given.   - Please follow up with your obstetrician at your next scheduled appointment.   - Please return for decreased/no fetal movement, vaginal bleeding similar to that of a period, leaking/gush of fluid, regular contractions or requiring pain medication   - Patient educated of plan and demonstrate understanding. All questions answered. Discharge instructions provided and signed. Patient verbalized understanding of   discharge plan.

## 2025-07-08 NOTE — OB RN TRIAGE NOTE - NS_DISPOSITION_OBGYN_ALL_OB
What Type Of Note Output Would You Prefer (Optional)?: Bullet Format What Is The Reason For Today's Visit?: Full Body Skin Examination What Is The Reason For Today's Visit? (Being Monitored For X): concerning skin lesions on a periodic basis Continue to Observe Discharge

## 2025-07-08 NOTE — OB RN TRIAGE NOTE - NSICDXPASTSURGICALHX_GEN_ALL_CORE_FT
PAST SURGICAL HISTORY:  H/O gastric sleeve     H/O gastric sleeve     History of toe surgery     History of tonsillectomy     S/P gastric sleeve procedure

## 2025-07-08 NOTE — OB PROVIDER TRIAGE NOTE - NSHPLABSRESULTS_GEN_ALL_CORE
11.7   11.93 )-----------( 242      ( 2025 20:21 )             35.3   07-08    134[L]  |  102  |  9   ----------------------------<  80  3.9   |  19[L]  |  0.54    Ca    9.2      2025 20:21    TPro  6.7  /  Alb  3.6  /  TBili  0.3  /  DBili  x   /  AST  22  /  ALT  18  /  AlkPhos  58  07-08    Urinalysis Basic - ( 2025 20:21 )    Color: Yellow / Appearance: Cloudy / S.024 / pH: x  Gluc: 80 mg/dL / Ketone: x  / Bili: Negative / Urobili: 1.0 mg/dL   Blood: x / Protein: Negative mg/dL / Nitrite: Negative   Leuk Esterase: Large / RBC: x / WBC x   Sq Epi: x / Non Sq Epi: x / Bacteria: x

## 2025-07-08 NOTE — ED ADULT TRIAGE NOTE - NS ED TRIAGE AVPU SCALE
no palpitations/no syncope/no peripheral edema Alert-The patient is alert, awake and responds to voice. The patient is oriented to time, place, and person. The triage nurse is able to obtain subjective information.

## 2025-07-11 DIAGNOSIS — Z3A.15 15 WEEKS GESTATION OF PREGNANCY: ICD-10-CM

## 2025-07-11 DIAGNOSIS — O26.892 OTHER SPECIFIED PREGNANCY RELATED CONDITIONS, SECOND TRIMESTER: ICD-10-CM

## 2025-07-11 DIAGNOSIS — O21.0 MILD HYPEREMESIS GRAVIDARUM: ICD-10-CM

## 2025-07-16 ENCOUNTER — RESULT REVIEW (OUTPATIENT)
Age: 25
End: 2025-07-16

## 2025-07-16 ENCOUNTER — OUTPATIENT (OUTPATIENT)
Dept: OUTPATIENT SERVICES | Facility: HOSPITAL | Age: 25
LOS: 1 days | End: 2025-07-16

## 2025-07-16 ENCOUNTER — APPOINTMENT (OUTPATIENT)
Dept: OBGYN | Facility: HOSPITAL | Age: 25
End: 2025-07-16
Payer: MEDICAID

## 2025-07-16 VITALS
HEART RATE: 78 BPM | BODY MASS INDEX: 30.99 KG/M2 | WEIGHT: 186 LBS | DIASTOLIC BLOOD PRESSURE: 69 MMHG | TEMPERATURE: 97.5 F | HEIGHT: 65 IN | SYSTOLIC BLOOD PRESSURE: 103 MMHG

## 2025-07-16 DIAGNOSIS — Z90.89 ACQUIRED ABSENCE OF OTHER ORGANS: Chronic | ICD-10-CM

## 2025-07-16 DIAGNOSIS — Z34.90 ENCOUNTER FOR SUPERVISION OF NORMAL PREGNANCY, UNSPECIFIED, UNSPECIFIED TRIMESTER: ICD-10-CM

## 2025-07-16 DIAGNOSIS — Z90.3 ACQUIRED ABSENCE OF STOMACH [PART OF]: Chronic | ICD-10-CM

## 2025-07-16 DIAGNOSIS — Z98.890 OTHER SPECIFIED POSTPROCEDURAL STATES: Chronic | ICD-10-CM

## 2025-07-16 DIAGNOSIS — B37.31 ACUTE CANDIDIASIS OF VULVA AND VAGINA: ICD-10-CM

## 2025-07-16 DIAGNOSIS — R42 DIZZINESS AND GIDDINESS: ICD-10-CM

## 2025-07-16 PROCEDURE — 99213 OFFICE O/P EST LOW 20 MIN: CPT | Mod: TH,25

## 2025-07-16 RX ORDER — BLOOD-GLUCOSE METER
W/DEVICE KIT MISCELLANEOUS
Qty: 1 | Refills: 0 | Status: ACTIVE | COMMUNITY
Start: 2025-07-16 | End: 1900-01-01

## 2025-07-16 RX ORDER — TERCONAZOLE 8 MG/G
0.8 CREAM VAGINAL DAILY
Qty: 5 | Refills: 1 | Status: ACTIVE | COMMUNITY
Start: 2025-07-16 | End: 1900-01-01

## 2025-07-16 RX ORDER — ALCOHOL ANTISEPTIC PADS
PADS, MEDICATED (EA) TOPICAL
Qty: 1 | Refills: 0 | Status: ACTIVE | COMMUNITY
Start: 2025-07-16 | End: 1900-01-01

## 2025-07-16 RX ORDER — ISOPROPYL ALCOHOL 70 ML/100ML
SWAB TOPICAL
Qty: 1 | Refills: 5 | Status: ACTIVE | COMMUNITY
Start: 2025-07-16 | End: 1900-01-01

## 2025-07-16 RX ORDER — BLOOD SUGAR DIAGNOSTIC
STRIP MISCELLANEOUS 4 TIMES DAILY
Qty: 1 | Refills: 3 | Status: ACTIVE | COMMUNITY
Start: 2025-07-16 | End: 1900-01-01

## 2025-07-17 ENCOUNTER — OUTPATIENT (OUTPATIENT)
Dept: OUTPATIENT SERVICES | Facility: HOSPITAL | Age: 25
LOS: 1 days | End: 2025-07-17

## 2025-07-17 ENCOUNTER — APPOINTMENT (OUTPATIENT)
Dept: MATERNAL FETAL MEDICINE | Facility: HOSPITAL | Age: 25
End: 2025-07-17
Payer: MEDICAID

## 2025-07-17 ENCOUNTER — APPOINTMENT (OUTPATIENT)
Dept: OBGYN | Facility: HOSPITAL | Age: 25
End: 2025-07-17
Payer: MEDICAID

## 2025-07-17 ENCOUNTER — ASOB RESULT (OUTPATIENT)
Age: 25
End: 2025-07-17

## 2025-07-17 VITALS
TEMPERATURE: 97.4 F | WEIGHT: 186 LBS | SYSTOLIC BLOOD PRESSURE: 110 MMHG | HEIGHT: 65 IN | HEART RATE: 76 BPM | BODY MASS INDEX: 30.99 KG/M2 | DIASTOLIC BLOOD PRESSURE: 59 MMHG

## 2025-07-17 DIAGNOSIS — Z90.3 ACQUIRED ABSENCE OF STOMACH [PART OF]: Chronic | ICD-10-CM

## 2025-07-17 PROBLEM — O21.9 NAUSEA AND VOMITING OF PREGNANCY, ANTEPARTUM: Status: ACTIVE | Noted: 2025-07-17

## 2025-07-17 LAB
AF-AFP MOM: 1.22 — SIGNIFICANT CHANGE UP
AFP CONCENTRATION: 39.84 NG/ML — SIGNIFICANT CHANGE UP
AFP INTERPRETATION: SIGNIFICANT CHANGE UP
AFP MOM CUT-OFF: 2.5 — SIGNIFICANT CHANGE UP
AFP PERCENTILE: 73.3 — SIGNIFICANT CHANGE UP
AFP SCREENING RESULT: SIGNIFICANT CHANGE UP
AFTER SCREENING RISK OPEN SPINA BIFIDA: SIGNIFICANT CHANGE UP
BEFORE SCREENING RISK OPEN SPINA BIFIDA: SIGNIFICANT CHANGE UP
CANDIDA AB TITR SER: DETECTED
EXTREME ANALYTE ALERT: NO — SIGNIFICANT CHANGE UP
G VAGINALIS DNA SPEC QL NAA+PROBE: DETECTED
GEN TEST INFORMATION: SIGNIFICANT CHANGE UP
GLUCOSE BLDC GLUCOMTR-MCNC: 81
MS GESTATIONAL AGE: SIGNIFICANT CHANGE UP
ORDER ENTRY INFORMATION: SIGNIFICANT CHANGE UP
T VAGINALIS SPEC QL WET PREP: SIGNIFICANT CHANGE UP

## 2025-07-17 PROCEDURE — 76805 OB US >/= 14 WKS SNGL FETUS: CPT | Mod: 26

## 2025-07-17 PROCEDURE — 99213 OFFICE O/P EST LOW 20 MIN: CPT | Mod: TH,25

## 2025-07-17 PROCEDURE — 76817 TRANSVAGINAL US OBSTETRIC: CPT | Mod: 26,59

## 2025-07-17 RX ORDER — DOXYLAMINE SUCCINATE 25 MG
25 TABLET ORAL
Qty: 30 | Refills: 1 | Status: ACTIVE | COMMUNITY
Start: 2025-07-17 | End: 1900-01-01

## 2025-07-17 RX ORDER — CLINDAMYCIN PHOSPHATE 20 MG/G
2 CREAM VAGINAL
Qty: 1 | Refills: 0 | Status: ACTIVE | COMMUNITY
Start: 2025-07-17 | End: 1900-01-01

## 2025-07-17 RX ORDER — IBUPROFEN 400 MG
0.4-50-0.1 TABLET ORAL
Qty: 30 | Refills: 0 | Status: ACTIVE | COMMUNITY
Start: 2025-07-17 | End: 1900-01-01

## 2025-07-18 DIAGNOSIS — R42 DIZZINESS AND GIDDINESS: ICD-10-CM

## 2025-07-18 DIAGNOSIS — Z34.90 ENCOUNTER FOR SUPERVISION OF NORMAL PREGNANCY, UNSPECIFIED, UNSPECIFIED TRIMESTER: ICD-10-CM

## 2025-07-18 DIAGNOSIS — O21.9 VOMITING OF PREGNANCY, UNSPECIFIED: ICD-10-CM

## 2025-07-22 ENCOUNTER — NON-APPOINTMENT (OUTPATIENT)
Age: 25
End: 2025-07-22

## 2025-07-23 ENCOUNTER — APPOINTMENT (OUTPATIENT)
Dept: OBGYN | Facility: HOSPITAL | Age: 25
End: 2025-07-23
Payer: MEDICAID

## 2025-07-23 ENCOUNTER — OUTPATIENT (OUTPATIENT)
Dept: OUTPATIENT SERVICES | Facility: HOSPITAL | Age: 25
LOS: 1 days | End: 2025-07-23

## 2025-07-23 ENCOUNTER — RESULT REVIEW (OUTPATIENT)
Age: 25
End: 2025-07-23

## 2025-07-23 VITALS
HEIGHT: 65 IN | DIASTOLIC BLOOD PRESSURE: 56 MMHG | WEIGHT: 188 LBS | HEART RATE: 102 BPM | BODY MASS INDEX: 31.32 KG/M2 | TEMPERATURE: 97.9 F | SYSTOLIC BLOOD PRESSURE: 102 MMHG

## 2025-07-23 DIAGNOSIS — Z90.3 ACQUIRED ABSENCE OF STOMACH [PART OF]: Chronic | ICD-10-CM

## 2025-07-23 DIAGNOSIS — R42 DIZZINESS AND GIDDINESS: ICD-10-CM

## 2025-07-23 DIAGNOSIS — Z90.89 ACQUIRED ABSENCE OF OTHER ORGANS: Chronic | ICD-10-CM

## 2025-07-23 DIAGNOSIS — O23.599 INFECTION OF OTHER PART OF GENITAL TRACT IN PREGNANCY, UNSPECIFIED TRIMESTER: ICD-10-CM

## 2025-07-23 DIAGNOSIS — B96.89 INFECTION OF OTHER PART OF GENITAL TRACT IN PREGNANCY, UNSPECIFIED TRIMESTER: ICD-10-CM

## 2025-07-23 DIAGNOSIS — Z34.90 ENCOUNTER FOR SUPERVISION OF NORMAL PREGNANCY, UNSPECIFIED, UNSPECIFIED TRIMESTER: ICD-10-CM

## 2025-07-23 DIAGNOSIS — Z98.890 OTHER SPECIFIED POSTPROCEDURAL STATES: Chronic | ICD-10-CM

## 2025-07-23 DIAGNOSIS — B37.31 ACUTE CANDIDIASIS OF VULVA AND VAGINA: ICD-10-CM

## 2025-07-23 LAB
ALBUMIN SERPL ELPH-MCNC: 3.9 G/DL — SIGNIFICANT CHANGE UP (ref 3.3–5)
ALP SERPL-CCNC: 61 U/L — SIGNIFICANT CHANGE UP (ref 40–120)
ALT FLD-CCNC: 19 U/L — SIGNIFICANT CHANGE UP (ref 4–33)
ANION GAP SERPL CALC-SCNC: 11 MMOL/L — SIGNIFICANT CHANGE UP (ref 7–14)
AST SERPL-CCNC: 20 U/L — SIGNIFICANT CHANGE UP (ref 4–32)
BASOPHILS # BLD AUTO: 0.03 K/UL — SIGNIFICANT CHANGE UP (ref 0–0.2)
BASOPHILS NFR BLD AUTO: 0.2 % — SIGNIFICANT CHANGE UP (ref 0–2)
BILIRUB SERPL-MCNC: 0.5 MG/DL — SIGNIFICANT CHANGE UP (ref 0.2–1.2)
BUN SERPL-MCNC: 6 MG/DL — LOW (ref 7–23)
CALCIUM SERPL-MCNC: 9.3 MG/DL — SIGNIFICANT CHANGE UP (ref 8.4–10.5)
CHLORIDE SERPL-SCNC: 104 MMOL/L — SIGNIFICANT CHANGE UP (ref 98–107)
CO2 SERPL-SCNC: 23 MMOL/L — SIGNIFICANT CHANGE UP (ref 22–31)
CREAT SERPL-MCNC: 0.55 MG/DL — SIGNIFICANT CHANGE UP (ref 0.5–1.3)
EGFR: 130 ML/MIN/1.73M2 — SIGNIFICANT CHANGE UP
EGFR: 130 ML/MIN/1.73M2 — SIGNIFICANT CHANGE UP
EOSINOPHIL # BLD AUTO: 0.4 K/UL — SIGNIFICANT CHANGE UP (ref 0–0.5)
EOSINOPHIL NFR BLD AUTO: 3.3 % — SIGNIFICANT CHANGE UP (ref 0–6)
GLUCOSE SERPL-MCNC: 67 MG/DL — LOW (ref 70–99)
HCT VFR BLD CALC: 38.2 % — SIGNIFICANT CHANGE UP (ref 34.5–45)
HGB BLD-MCNC: 12.3 G/DL — SIGNIFICANT CHANGE UP (ref 11.5–15.5)
IMM GRANULOCYTES # BLD AUTO: 0.05 K/UL — SIGNIFICANT CHANGE UP (ref 0–0.07)
IMM GRANULOCYTES NFR BLD AUTO: 0.4 % — SIGNIFICANT CHANGE UP (ref 0–0.9)
LYMPHOCYTES # BLD AUTO: 2.32 K/UL — SIGNIFICANT CHANGE UP (ref 1–3.3)
LYMPHOCYTES NFR BLD AUTO: 19.2 % — SIGNIFICANT CHANGE UP (ref 13–44)
MCHC RBC-ENTMCNC: 30.1 PG — SIGNIFICANT CHANGE UP (ref 27–34)
MCHC RBC-ENTMCNC: 32.2 G/DL — SIGNIFICANT CHANGE UP (ref 32–36)
MCV RBC AUTO: 93.6 FL — SIGNIFICANT CHANGE UP (ref 80–100)
MONOCYTES # BLD AUTO: 0.83 K/UL — SIGNIFICANT CHANGE UP (ref 0–0.9)
MONOCYTES NFR BLD AUTO: 6.9 % — SIGNIFICANT CHANGE UP (ref 2–14)
NEUTROPHILS # BLD AUTO: 8.48 K/UL — HIGH (ref 1.8–7.4)
NEUTROPHILS NFR BLD AUTO: 70 % — SIGNIFICANT CHANGE UP (ref 43–77)
NRBC # BLD AUTO: 0 K/UL — SIGNIFICANT CHANGE UP (ref 0–0)
NRBC # FLD: 0 K/UL — SIGNIFICANT CHANGE UP (ref 0–0)
NRBC BLD AUTO-RTO: 0 /100 WBCS — SIGNIFICANT CHANGE UP (ref 0–0)
PLATELET # BLD AUTO: 281 K/UL — SIGNIFICANT CHANGE UP (ref 150–400)
PMV BLD: 9.7 FL — SIGNIFICANT CHANGE UP (ref 7–13)
POTASSIUM SERPL-MCNC: 3.9 MMOL/L — SIGNIFICANT CHANGE UP (ref 3.5–5.3)
POTASSIUM SERPL-SCNC: 3.9 MMOL/L — SIGNIFICANT CHANGE UP (ref 3.5–5.3)
PROT SERPL-MCNC: 7.1 G/DL — SIGNIFICANT CHANGE UP (ref 6–8.3)
RBC # BLD: 4.08 M/UL — SIGNIFICANT CHANGE UP (ref 3.8–5.2)
RBC # FLD: 12.8 % — SIGNIFICANT CHANGE UP (ref 10.3–14.5)
SODIUM SERPL-SCNC: 138 MMOL/L — SIGNIFICANT CHANGE UP (ref 135–145)
WBC # BLD: 12.11 K/UL — HIGH (ref 3.8–10.5)
WBC # FLD AUTO: 12.11 K/UL — HIGH (ref 3.8–10.5)

## 2025-07-23 PROCEDURE — 99213 OFFICE O/P EST LOW 20 MIN: CPT | Mod: TH,25

## 2025-07-23 PROCEDURE — 76815 OB US LIMITED FETUS(S): CPT | Mod: 26

## 2025-07-23 RX ORDER — TERCONAZOLE 4 MG/G
0.4 CREAM VAGINAL DAILY
Qty: 7 | Refills: 0 | Status: ACTIVE | COMMUNITY
Start: 2025-07-23 | End: 1900-01-01

## 2025-07-24 LAB — VIT B12 SERPL-MCNC: 610 PG/ML — SIGNIFICANT CHANGE UP (ref 200–900)

## 2025-07-31 DIAGNOSIS — Z00.00 ENCOUNTER FOR GENERAL ADULT MEDICAL EXAMINATION WITHOUT ABNORMAL FINDINGS: ICD-10-CM

## 2025-08-05 ENCOUNTER — EMERGENCY (EMERGENCY)
Facility: HOSPITAL | Age: 25
LOS: 1 days | End: 2025-08-05
Admitting: EMERGENCY MEDICINE
Payer: MEDICAID

## 2025-08-05 ENCOUNTER — OUTPATIENT (OUTPATIENT)
Dept: INPATIENT UNIT | Facility: HOSPITAL | Age: 25
LOS: 1 days | End: 2025-08-05
Payer: MEDICAID

## 2025-08-05 ENCOUNTER — APPOINTMENT (OUTPATIENT)
Dept: ANTEPARTUM | Facility: CLINIC | Age: 25
End: 2025-08-05

## 2025-08-05 VITALS
HEART RATE: 74 BPM | HEIGHT: 65 IN | WEIGHT: 190.04 LBS | RESPIRATION RATE: 19 BRPM | TEMPERATURE: 98 F | SYSTOLIC BLOOD PRESSURE: 103 MMHG | OXYGEN SATURATION: 100 % | DIASTOLIC BLOOD PRESSURE: 69 MMHG

## 2025-08-05 VITALS — SYSTOLIC BLOOD PRESSURE: 106 MMHG | HEART RATE: 76 BPM | DIASTOLIC BLOOD PRESSURE: 58 MMHG

## 2025-08-05 VITALS — RESPIRATION RATE: 16 BRPM | TEMPERATURE: 98 F

## 2025-08-05 DIAGNOSIS — Z98.890 OTHER SPECIFIED POSTPROCEDURAL STATES: Chronic | ICD-10-CM

## 2025-08-05 DIAGNOSIS — Z90.3 ACQUIRED ABSENCE OF STOMACH [PART OF]: Chronic | ICD-10-CM

## 2025-08-05 DIAGNOSIS — O26.899 OTHER SPECIFIED PREGNANCY RELATED CONDITIONS, UNSPECIFIED TRIMESTER: ICD-10-CM

## 2025-08-05 DIAGNOSIS — Z90.89 ACQUIRED ABSENCE OF OTHER ORGANS: Chronic | ICD-10-CM

## 2025-08-05 LAB
APPEARANCE UR: CLEAR — SIGNIFICANT CHANGE UP
BACTERIA # UR AUTO: ABNORMAL /HPF
BILIRUB UR-MCNC: NEGATIVE — SIGNIFICANT CHANGE UP
CAST: 0 /LPF — SIGNIFICANT CHANGE UP (ref 0–4)
COLOR SPEC: YELLOW — SIGNIFICANT CHANGE UP
DIFF PNL FLD: NEGATIVE — SIGNIFICANT CHANGE UP
GLUCOSE UR QL: NEGATIVE MG/DL — SIGNIFICANT CHANGE UP
KETONES UR QL: NEGATIVE MG/DL — SIGNIFICANT CHANGE UP
LEUKOCYTE ESTERASE UR-ACNC: ABNORMAL
NITRITE UR-MCNC: NEGATIVE — SIGNIFICANT CHANGE UP
PH UR: 6.5 — SIGNIFICANT CHANGE UP (ref 5–8)
PROT UR-MCNC: NEGATIVE MG/DL — SIGNIFICANT CHANGE UP
RBC CASTS # UR COMP ASSIST: 0 /HPF — SIGNIFICANT CHANGE UP (ref 0–4)
SP GR SPEC: 1.02 — SIGNIFICANT CHANGE UP (ref 1–1.03)
SQUAMOUS # UR AUTO: 5 /HPF — SIGNIFICANT CHANGE UP (ref 0–5)
UROBILINOGEN FLD QL: 0.2 MG/DL — SIGNIFICANT CHANGE UP (ref 0.2–1)
WBC UR QL: 25 /HPF — HIGH (ref 0–5)

## 2025-08-05 PROCEDURE — L9996: CPT

## 2025-08-05 PROCEDURE — 99222 1ST HOSP IP/OBS MODERATE 55: CPT

## 2025-08-05 RX ORDER — ACETAMINOPHEN 500 MG/5ML
1000 LIQUID (ML) ORAL ONCE
Refills: 0 | Status: COMPLETED | OUTPATIENT
Start: 2025-08-05 | End: 2025-08-05

## 2025-08-05 RX ORDER — CEPHALEXIN 250 MG/1
1 CAPSULE ORAL
Qty: 10 | Refills: 0
Start: 2025-08-05 | End: 2025-08-09

## 2025-08-05 RX ADMIN — Medication 1000 MILLIGRAM(S): at 21:45

## 2025-08-05 RX ADMIN — Medication 1000 MILLIGRAM(S): at 21:13

## 2025-08-07 DIAGNOSIS — G43.909 MIGRAINE, UNSPECIFIED, NOT INTRACTABLE, WITHOUT STATUS MIGRAINOSUS: ICD-10-CM

## 2025-08-07 DIAGNOSIS — R51.9 HEADACHE, UNSPECIFIED: ICD-10-CM

## 2025-08-07 DIAGNOSIS — O26.892 OTHER SPECIFIED PREGNANCY RELATED CONDITIONS, SECOND TRIMESTER: ICD-10-CM

## 2025-08-07 DIAGNOSIS — Z3A.19 19 WEEKS GESTATION OF PREGNANCY: ICD-10-CM

## 2025-08-07 DIAGNOSIS — Z98.84 BARIATRIC SURGERY STATUS: ICD-10-CM

## 2025-08-07 DIAGNOSIS — O99.352 DISEASES OF THE NERVOUS SYSTEM COMPLICATING PREGNANCY, SECOND TRIMESTER: ICD-10-CM

## 2025-08-07 DIAGNOSIS — O23.42 UNSPECIFIED INFECTION OF URINARY TRACT IN PREGNANCY, SECOND TRIMESTER: ICD-10-CM

## 2025-08-08 ENCOUNTER — ASOB RESULT (OUTPATIENT)
Age: 25
End: 2025-08-08

## 2025-08-08 ENCOUNTER — OUTPATIENT (OUTPATIENT)
Dept: OUTPATIENT SERVICES | Facility: HOSPITAL | Age: 25
LOS: 1 days | End: 2025-08-08

## 2025-08-08 ENCOUNTER — APPOINTMENT (OUTPATIENT)
Dept: OBGYN | Facility: HOSPITAL | Age: 25
End: 2025-08-08
Payer: MEDICAID

## 2025-08-08 ENCOUNTER — APPOINTMENT (OUTPATIENT)
Dept: MATERNAL FETAL MEDICINE | Facility: HOSPITAL | Age: 25
End: 2025-08-08
Payer: MEDICAID

## 2025-08-08 VITALS
BODY MASS INDEX: 32.32 KG/M2 | TEMPERATURE: 97.3 F | DIASTOLIC BLOOD PRESSURE: 64 MMHG | HEART RATE: 73 BPM | WEIGHT: 194 LBS | HEIGHT: 65 IN | SYSTOLIC BLOOD PRESSURE: 103 MMHG

## 2025-08-08 DIAGNOSIS — Z90.89 ACQUIRED ABSENCE OF OTHER ORGANS: Chronic | ICD-10-CM

## 2025-08-08 DIAGNOSIS — N39.0 URINARY TRACT INFECTION, SITE NOT SPECIFIED: ICD-10-CM

## 2025-08-08 DIAGNOSIS — Z98.890 OTHER SPECIFIED POSTPROCEDURAL STATES: Chronic | ICD-10-CM

## 2025-08-08 DIAGNOSIS — Z34.90 ENCOUNTER FOR SUPERVISION OF NORMAL PREGNANCY, UNSPECIFIED, UNSPECIFIED TRIMESTER: ICD-10-CM

## 2025-08-08 LAB
-  AMOXICILLIN/CLAVULANIC ACID: SIGNIFICANT CHANGE UP
-  AMPICILLIN/SULBACTAM: SIGNIFICANT CHANGE UP
-  AMPICILLIN: SIGNIFICANT CHANGE UP
-  AMPICILLIN: SIGNIFICANT CHANGE UP
-  AZTREONAM: SIGNIFICANT CHANGE UP
-  CEFAZOLIN: SIGNIFICANT CHANGE UP
-  CEFEPIME: SIGNIFICANT CHANGE UP
-  CEFOXITIN: SIGNIFICANT CHANGE UP
-  CEFTRIAXONE: SIGNIFICANT CHANGE UP
-  CEFUROXIME: SIGNIFICANT CHANGE UP
-  CIPROFLOXACIN: SIGNIFICANT CHANGE UP
-  CIPROFLOXACIN: SIGNIFICANT CHANGE UP
-  ERTAPENEM: SIGNIFICANT CHANGE UP
-  GENTAMICIN: SIGNIFICANT CHANGE UP
-  IMIPENEM: SIGNIFICANT CHANGE UP
-  LEVOFLOXACIN: SIGNIFICANT CHANGE UP
-  LEVOFLOXACIN: SIGNIFICANT CHANGE UP
-  MEROPENEM: SIGNIFICANT CHANGE UP
-  NITROFURANTOIN: SIGNIFICANT CHANGE UP
-  NITROFURANTOIN: SIGNIFICANT CHANGE UP
-  PIPERACILLIN/TAZOBACTAM: SIGNIFICANT CHANGE UP
-  TETRACYCLINE: SIGNIFICANT CHANGE UP
-  TIGECYCLINE: SIGNIFICANT CHANGE UP
-  TOBRAMYCIN: SIGNIFICANT CHANGE UP
-  TRIMETHOPRIM/SULFAMETHOXAZOLE: SIGNIFICANT CHANGE UP
-  VANCOMYCIN: SIGNIFICANT CHANGE UP
CULTURE RESULTS: ABNORMAL
METHOD TYPE: SIGNIFICANT CHANGE UP
METHOD TYPE: SIGNIFICANT CHANGE UP
ORGANISM # SPEC MICROSCOPIC CNT: ABNORMAL
SPECIMEN SOURCE: SIGNIFICANT CHANGE UP

## 2025-08-08 PROCEDURE — 99213 OFFICE O/P EST LOW 20 MIN: CPT | Mod: TH,25

## 2025-08-08 PROCEDURE — 76811 OB US DETAILED SNGL FETUS: CPT | Mod: 26

## 2025-08-08 RX ORDER — NITROFURANTOIN (MONOHYDRATE/MACROCRYSTALS) 25; 75 MG/1; MG/1
100 CAPSULE ORAL TWICE DAILY
Qty: 14 | Refills: 0 | Status: ACTIVE | COMMUNITY
Start: 2025-08-08 | End: 1900-01-01

## 2025-08-11 ENCOUNTER — NON-APPOINTMENT (OUTPATIENT)
Age: 25
End: 2025-08-11

## 2025-08-12 DIAGNOSIS — Z34.90 ENCOUNTER FOR SUPERVISION OF NORMAL PREGNANCY, UNSPECIFIED, UNSPECIFIED TRIMESTER: ICD-10-CM

## 2025-08-12 DIAGNOSIS — N39.0 URINARY TRACT INFECTION, SITE NOT SPECIFIED: ICD-10-CM

## 2025-08-14 ENCOUNTER — ASOB RESULT (OUTPATIENT)
Age: 25
End: 2025-08-14

## 2025-08-14 ENCOUNTER — APPOINTMENT (OUTPATIENT)
Dept: MATERNAL FETAL MEDICINE | Facility: HOSPITAL | Age: 25
End: 2025-08-14
Payer: MEDICAID

## 2025-08-14 PROCEDURE — 76816 OB US FOLLOW-UP PER FETUS: CPT | Mod: 26

## 2025-08-16 ENCOUNTER — OUTPATIENT (OUTPATIENT)
Dept: INPATIENT UNIT | Facility: HOSPITAL | Age: 25
LOS: 1 days | End: 2025-08-16
Payer: MEDICAID

## 2025-08-16 ENCOUNTER — EMERGENCY (EMERGENCY)
Facility: HOSPITAL | Age: 25
LOS: 1 days | End: 2025-08-16
Admitting: EMERGENCY MEDICINE
Payer: MEDICAID

## 2025-08-16 ENCOUNTER — APPOINTMENT (OUTPATIENT)
Dept: ANTEPARTUM | Facility: CLINIC | Age: 25
End: 2025-08-16

## 2025-08-16 VITALS
WEIGHT: 190.04 LBS | HEART RATE: 90 BPM | SYSTOLIC BLOOD PRESSURE: 103 MMHG | OXYGEN SATURATION: 100 % | HEIGHT: 65 IN | DIASTOLIC BLOOD PRESSURE: 66 MMHG | RESPIRATION RATE: 18 BRPM | TEMPERATURE: 98 F

## 2025-08-16 VITALS
SYSTOLIC BLOOD PRESSURE: 92 MMHG | HEART RATE: 105 BPM | DIASTOLIC BLOOD PRESSURE: 54 MMHG | TEMPERATURE: 98 F | RESPIRATION RATE: 18 BRPM

## 2025-08-16 DIAGNOSIS — Z98.890 OTHER SPECIFIED POSTPROCEDURAL STATES: Chronic | ICD-10-CM

## 2025-08-16 DIAGNOSIS — Z90.3 ACQUIRED ABSENCE OF STOMACH [PART OF]: Chronic | ICD-10-CM

## 2025-08-16 DIAGNOSIS — Z90.89 ACQUIRED ABSENCE OF OTHER ORGANS: Chronic | ICD-10-CM

## 2025-08-16 DIAGNOSIS — O26.899 OTHER SPECIFIED PREGNANCY RELATED CONDITIONS, UNSPECIFIED TRIMESTER: ICD-10-CM

## 2025-08-16 LAB
ALBUMIN SERPL ELPH-MCNC: 3.7 G/DL — SIGNIFICANT CHANGE UP (ref 3.3–5)
ALP SERPL-CCNC: 64 U/L — SIGNIFICANT CHANGE UP (ref 40–120)
ALT FLD-CCNC: 40 U/L — HIGH (ref 4–33)
ANION GAP SERPL CALC-SCNC: 11 MMOL/L — SIGNIFICANT CHANGE UP (ref 7–14)
APPEARANCE UR: CLEAR — SIGNIFICANT CHANGE UP
AST SERPL-CCNC: 43 U/L — HIGH (ref 4–32)
BILIRUB SERPL-MCNC: 0.5 MG/DL — SIGNIFICANT CHANGE UP (ref 0.2–1.2)
BILIRUB UR-MCNC: NEGATIVE — SIGNIFICANT CHANGE UP
BUN SERPL-MCNC: 5 MG/DL — LOW (ref 7–23)
CALCIUM SERPL-MCNC: 8.5 MG/DL — SIGNIFICANT CHANGE UP (ref 8.4–10.5)
CHLORIDE SERPL-SCNC: 103 MMOL/L — SIGNIFICANT CHANGE UP (ref 98–107)
CO2 SERPL-SCNC: 22 MMOL/L — SIGNIFICANT CHANGE UP (ref 22–31)
COLOR SPEC: YELLOW — SIGNIFICANT CHANGE UP
CREAT SERPL-MCNC: 0.59 MG/DL — SIGNIFICANT CHANGE UP (ref 0.5–1.3)
DIFF PNL FLD: NEGATIVE — SIGNIFICANT CHANGE UP
EGFR: 127 ML/MIN/1.73M2 — SIGNIFICANT CHANGE UP
EGFR: 127 ML/MIN/1.73M2 — SIGNIFICANT CHANGE UP
GLUCOSE SERPL-MCNC: 73 MG/DL — SIGNIFICANT CHANGE UP (ref 70–99)
GLUCOSE UR QL: NEGATIVE MG/DL — SIGNIFICANT CHANGE UP
HCT VFR BLD CALC: 33.2 % — LOW (ref 34.5–45)
HGB BLD-MCNC: 11 G/DL — LOW (ref 11.5–15.5)
KETONES UR QL: NEGATIVE MG/DL — SIGNIFICANT CHANGE UP
LEUKOCYTE ESTERASE UR-ACNC: NEGATIVE — SIGNIFICANT CHANGE UP
MCHC RBC-ENTMCNC: 31.1 PG — SIGNIFICANT CHANGE UP (ref 27–34)
MCHC RBC-ENTMCNC: 33.1 G/DL — SIGNIFICANT CHANGE UP (ref 32–36)
MCV RBC AUTO: 93.8 FL — SIGNIFICANT CHANGE UP (ref 80–100)
NITRITE UR-MCNC: NEGATIVE — SIGNIFICANT CHANGE UP
NRBC # BLD AUTO: 0 K/UL — SIGNIFICANT CHANGE UP (ref 0–0)
NRBC # FLD: 0 K/UL — SIGNIFICANT CHANGE UP (ref 0–0)
NRBC BLD AUTO-RTO: 0 /100 WBCS — SIGNIFICANT CHANGE UP (ref 0–0)
PH UR: 6 — SIGNIFICANT CHANGE UP (ref 5–8)
PLATELET # BLD AUTO: 224 K/UL — SIGNIFICANT CHANGE UP (ref 150–400)
PMV BLD: 9.6 FL — SIGNIFICANT CHANGE UP (ref 7–13)
POTASSIUM SERPL-MCNC: 3.8 MMOL/L — SIGNIFICANT CHANGE UP (ref 3.5–5.3)
POTASSIUM SERPL-SCNC: 3.8 MMOL/L — SIGNIFICANT CHANGE UP (ref 3.5–5.3)
PROT SERPL-MCNC: 6.6 G/DL — SIGNIFICANT CHANGE UP (ref 6–8.3)
PROT UR-MCNC: NEGATIVE MG/DL — SIGNIFICANT CHANGE UP
RBC # BLD: 3.54 M/UL — LOW (ref 3.8–5.2)
RBC # FLD: 12.8 % — SIGNIFICANT CHANGE UP (ref 10.3–14.5)
SODIUM SERPL-SCNC: 136 MMOL/L — SIGNIFICANT CHANGE UP (ref 135–145)
SP GR SPEC: 1.01 — SIGNIFICANT CHANGE UP (ref 1–1.03)
UROBILINOGEN FLD QL: 0.2 MG/DL — SIGNIFICANT CHANGE UP (ref 0.2–1)
WBC # BLD: 8.18 K/UL — SIGNIFICANT CHANGE UP (ref 3.8–10.5)
WBC # FLD AUTO: 8.18 K/UL — SIGNIFICANT CHANGE UP (ref 3.8–10.5)

## 2025-08-16 PROCEDURE — 99204 OFFICE O/P NEW MOD 45 MIN: CPT | Mod: TH

## 2025-08-16 PROCEDURE — L9996: CPT

## 2025-08-16 RX ORDER — ONDANSETRON HCL/PF 4 MG/2 ML
4 VIAL (ML) INJECTION ONCE
Refills: 0 | Status: COMPLETED | OUTPATIENT
Start: 2025-08-16 | End: 2025-08-16

## 2025-08-16 RX ORDER — SODIUM CHLORIDE 9 G/1000ML
100 INJECTION, SOLUTION INTRAVENOUS ONCE
Refills: 0 | Status: COMPLETED | OUTPATIENT
Start: 2025-08-16 | End: 2025-08-16

## 2025-08-16 RX ORDER — ACETAMINOPHEN 500 MG/5ML
1000 LIQUID (ML) ORAL ONCE
Refills: 0 | Status: COMPLETED | OUTPATIENT
Start: 2025-08-16 | End: 2025-08-16

## 2025-08-16 RX ORDER — TERCONAZOLE 0.4 %
1 CREAM WITH APPLICATOR VAGINAL
Refills: 0 | DISCHARGE

## 2025-08-16 RX ADMIN — SODIUM CHLORIDE 200 MILLILITER(S): 9 INJECTION, SOLUTION INTRAVENOUS at 21:23

## 2025-08-16 RX ADMIN — Medication 4 MILLIGRAM(S): at 23:11

## 2025-08-16 RX ADMIN — Medication 1000 MILLIGRAM(S): at 23:50

## 2025-08-16 RX ADMIN — Medication 400 MILLIGRAM(S): at 23:11

## 2025-08-17 VITALS — SYSTOLIC BLOOD PRESSURE: 103 MMHG | DIASTOLIC BLOOD PRESSURE: 55 MMHG | HEART RATE: 90 BPM

## 2025-08-20 DIAGNOSIS — O36.8120 DECREASED FETAL MOVEMENTS, SECOND TRIMESTER, NOT APPLICABLE OR UNSPECIFIED: ICD-10-CM

## 2025-08-20 DIAGNOSIS — J02.9 ACUTE PHARYNGITIS, UNSPECIFIED: ICD-10-CM

## 2025-08-20 DIAGNOSIS — R52 PAIN, UNSPECIFIED: ICD-10-CM

## 2025-08-20 DIAGNOSIS — O21.2 LATE VOMITING OF PREGNANCY: ICD-10-CM

## 2025-08-20 DIAGNOSIS — O26.892 OTHER SPECIFIED PREGNANCY RELATED CONDITIONS, SECOND TRIMESTER: ICD-10-CM

## 2025-08-20 DIAGNOSIS — Z3A.21 21 WEEKS GESTATION OF PREGNANCY: ICD-10-CM

## 2025-08-20 DIAGNOSIS — H92.09 OTALGIA, UNSPECIFIED EAR: ICD-10-CM

## 2025-08-20 DIAGNOSIS — O99.891 OTHER SPECIFIED DISEASES AND CONDITIONS COMPLICATING PREGNANCY: ICD-10-CM

## 2025-08-20 DIAGNOSIS — R50.9 FEVER, UNSPECIFIED: ICD-10-CM

## 2025-08-20 DIAGNOSIS — O99.512 DISEASES OF THE RESPIRATORY SYSTEM COMPLICATING PREGNANCY, SECOND TRIMESTER: ICD-10-CM

## 2025-08-20 DIAGNOSIS — R05.9 COUGH, UNSPECIFIED: ICD-10-CM

## 2025-09-05 ENCOUNTER — RESULT REVIEW (OUTPATIENT)
Age: 25
End: 2025-09-05

## 2025-09-05 ENCOUNTER — OUTPATIENT (OUTPATIENT)
Dept: OUTPATIENT SERVICES | Facility: HOSPITAL | Age: 25
LOS: 1 days | End: 2025-09-05

## 2025-09-05 ENCOUNTER — APPOINTMENT (OUTPATIENT)
Dept: OBGYN | Facility: HOSPITAL | Age: 25
End: 2025-09-05
Payer: MEDICAID

## 2025-09-05 ENCOUNTER — APPOINTMENT (OUTPATIENT)
Dept: CARE COORDINATION | Facility: HOME HEALTH | Age: 25
End: 2025-09-05

## 2025-09-05 VITALS
HEART RATE: 76 BPM | HEIGHT: 65 IN | WEIGHT: 194 LBS | BODY MASS INDEX: 32.32 KG/M2 | SYSTOLIC BLOOD PRESSURE: 109 MMHG | DIASTOLIC BLOOD PRESSURE: 67 MMHG | TEMPERATURE: 98.1 F

## 2025-09-05 DIAGNOSIS — Z98.890 OTHER SPECIFIED POSTPROCEDURAL STATES: Chronic | ICD-10-CM

## 2025-09-05 DIAGNOSIS — Z90.3 ACQUIRED ABSENCE OF STOMACH [PART OF]: Chronic | ICD-10-CM

## 2025-09-05 DIAGNOSIS — Z34.90 ENCOUNTER FOR SUPERVISION OF NORMAL PREGNANCY, UNSPECIFIED, UNSPECIFIED TRIMESTER: ICD-10-CM

## 2025-09-05 DIAGNOSIS — Z90.89 ACQUIRED ABSENCE OF OTHER ORGANS: Chronic | ICD-10-CM

## 2025-09-05 DIAGNOSIS — N39.0 URINARY TRACT INFECTION, SITE NOT SPECIFIED: ICD-10-CM

## 2025-09-05 PROCEDURE — 99213 OFFICE O/P EST LOW 20 MIN: CPT | Mod: 25

## 2025-09-06 LAB
CULTURE RESULTS: SIGNIFICANT CHANGE UP
SPECIMEN SOURCE: SIGNIFICANT CHANGE UP

## 2025-09-16 ENCOUNTER — EMERGENCY (EMERGENCY)
Facility: HOSPITAL | Age: 25
LOS: 1 days | End: 2025-09-16
Admitting: EMERGENCY MEDICINE
Payer: MEDICAID

## 2025-09-16 ENCOUNTER — ASOB RESULT (OUTPATIENT)
Age: 25
End: 2025-09-16

## 2025-09-16 ENCOUNTER — APPOINTMENT (OUTPATIENT)
Dept: ANTEPARTUM | Facility: CLINIC | Age: 25
End: 2025-09-16
Payer: MEDICAID

## 2025-09-16 VITALS
HEART RATE: 83 BPM | WEIGHT: 194.01 LBS | TEMPERATURE: 98 F | SYSTOLIC BLOOD PRESSURE: 96 MMHG | HEIGHT: 65 IN | OXYGEN SATURATION: 100 % | RESPIRATION RATE: 16 BRPM | DIASTOLIC BLOOD PRESSURE: 62 MMHG

## 2025-09-16 DIAGNOSIS — Z98.890 OTHER SPECIFIED POSTPROCEDURAL STATES: Chronic | ICD-10-CM

## 2025-09-16 DIAGNOSIS — Z90.89 ACQUIRED ABSENCE OF OTHER ORGANS: Chronic | ICD-10-CM

## 2025-09-16 DIAGNOSIS — Z90.3 ACQUIRED ABSENCE OF STOMACH [PART OF]: Chronic | ICD-10-CM

## 2025-09-16 PROCEDURE — L9996: CPT

## 2025-09-16 PROCEDURE — 76815 OB US LIMITED FETUS(S): CPT | Mod: 26

## 2025-09-16 PROCEDURE — 76817 TRANSVAGINAL US OBSTETRIC: CPT | Mod: 26

## 2025-09-18 ENCOUNTER — NON-APPOINTMENT (OUTPATIENT)
Age: 25
End: 2025-09-18